# Patient Record
Sex: FEMALE | Race: WHITE | NOT HISPANIC OR LATINO | Employment: UNEMPLOYED | URBAN - METROPOLITAN AREA
[De-identification: names, ages, dates, MRNs, and addresses within clinical notes are randomized per-mention and may not be internally consistent; named-entity substitution may affect disease eponyms.]

---

## 2024-02-15 ENCOUNTER — OFFICE VISIT (OUTPATIENT)
Dept: FAMILY MEDICINE | Facility: CLINIC | Age: 39
End: 2024-02-15
Payer: COMMERCIAL

## 2024-02-15 VITALS
TEMPERATURE: 98 F | HEIGHT: 62 IN | WEIGHT: 194 LBS | HEART RATE: 100 BPM | DIASTOLIC BLOOD PRESSURE: 70 MMHG | SYSTOLIC BLOOD PRESSURE: 114 MMHG | BODY MASS INDEX: 35.7 KG/M2

## 2024-02-15 DIAGNOSIS — Z13.1 SCREENING FOR DIABETES MELLITUS: ICD-10-CM

## 2024-02-15 DIAGNOSIS — F41.1 GAD (GENERALIZED ANXIETY DISORDER): ICD-10-CM

## 2024-02-15 DIAGNOSIS — R07.81 RIB PAIN: ICD-10-CM

## 2024-02-15 DIAGNOSIS — F31.81 BIPOLAR 2 DISORDER: ICD-10-CM

## 2024-02-15 DIAGNOSIS — G43.709 CHRONIC MIGRAINE WITHOUT AURA WITHOUT STATUS MIGRAINOSUS, NOT INTRACTABLE: ICD-10-CM

## 2024-02-15 DIAGNOSIS — J45.40 MODERATE PERSISTENT ASTHMA WITHOUT COMPLICATION: ICD-10-CM

## 2024-02-15 DIAGNOSIS — K21.9 GASTROESOPHAGEAL REFLUX DISEASE, UNSPECIFIED WHETHER ESOPHAGITIS PRESENT: ICD-10-CM

## 2024-02-15 DIAGNOSIS — Z13.220 ENCOUNTER FOR LIPID SCREENING FOR CARDIOVASCULAR DISEASE: ICD-10-CM

## 2024-02-15 DIAGNOSIS — F17.200 TOBACCO DEPENDENCE: ICD-10-CM

## 2024-02-15 DIAGNOSIS — Z00.00 ENCOUNTER FOR ANNUAL HEALTH EXAMINATION: ICD-10-CM

## 2024-02-15 DIAGNOSIS — I10 PRIMARY HYPERTENSION: Primary | ICD-10-CM

## 2024-02-15 DIAGNOSIS — Z11.59 ENCOUNTER FOR HEPATITIS C SCREENING TEST FOR LOW RISK PATIENT: ICD-10-CM

## 2024-02-15 DIAGNOSIS — Z13.6 ENCOUNTER FOR LIPID SCREENING FOR CARDIOVASCULAR DISEASE: ICD-10-CM

## 2024-02-15 PROCEDURE — 1159F MED LIST DOCD IN RCRD: CPT | Mod: CPTII,S$GLB,, | Performed by: INTERNAL MEDICINE

## 2024-02-15 PROCEDURE — 3008F BODY MASS INDEX DOCD: CPT | Mod: CPTII,S$GLB,, | Performed by: INTERNAL MEDICINE

## 2024-02-15 PROCEDURE — 3078F DIAST BP <80 MM HG: CPT | Mod: CPTII,S$GLB,, | Performed by: INTERNAL MEDICINE

## 2024-02-15 PROCEDURE — 99406 BEHAV CHNG SMOKING 3-10 MIN: CPT | Mod: S$GLB,,, | Performed by: INTERNAL MEDICINE

## 2024-02-15 PROCEDURE — 3074F SYST BP LT 130 MM HG: CPT | Mod: CPTII,S$GLB,, | Performed by: INTERNAL MEDICINE

## 2024-02-15 PROCEDURE — 99204 OFFICE O/P NEW MOD 45 MIN: CPT | Mod: 25,S$GLB,, | Performed by: INTERNAL MEDICINE

## 2024-02-15 PROCEDURE — 99999 PR PBB SHADOW E&M-EST. PATIENT-LVL V: CPT | Mod: PBBFAC,,, | Performed by: INTERNAL MEDICINE

## 2024-02-15 RX ORDER — SUMATRIPTAN SUCCINATE 25 MG/1
25 TABLET ORAL
COMMUNITY
Start: 2024-01-30 | End: 2024-02-22 | Stop reason: ALTCHOICE

## 2024-02-15 RX ORDER — BUDESONIDE AND FORMOTEROL FUMARATE DIHYDRATE 160; 4.5 UG/1; UG/1
2 AEROSOL RESPIRATORY (INHALATION) EVERY 12 HOURS
COMMUNITY

## 2024-02-15 RX ORDER — VARENICLINE TARTRATE 0.5 (11)-1
KIT ORAL
Qty: 1 EACH | Refills: 0 | Status: SHIPPED | OUTPATIENT
Start: 2024-02-15 | End: 2024-05-22

## 2024-02-15 RX ORDER — OXCARBAZEPINE 300 MG/1
300 TABLET, FILM COATED ORAL 3 TIMES DAILY
COMMUNITY
Start: 2024-01-29

## 2024-02-15 RX ORDER — TRAZODONE HYDROCHLORIDE 50 MG/1
50 TABLET ORAL NIGHTLY PRN
COMMUNITY
Start: 2024-02-04

## 2024-02-15 RX ORDER — ALBUTEROL SULFATE 90 UG/1
2 AEROSOL, METERED RESPIRATORY (INHALATION) EVERY 4 HOURS PRN
COMMUNITY

## 2024-02-15 RX ORDER — PANTOPRAZOLE SODIUM 40 MG/1
40 TABLET, DELAYED RELEASE ORAL DAILY
COMMUNITY

## 2024-02-15 RX ORDER — AMLODIPINE BESYLATE 10 MG/1
10 TABLET ORAL DAILY
COMMUNITY

## 2024-02-15 RX ORDER — VENLAFAXINE HYDROCHLORIDE 225 MG/1
1 TABLET, EXTENDED RELEASE ORAL DAILY
COMMUNITY
Start: 2024-02-03

## 2024-02-16 ENCOUNTER — HOSPITAL ENCOUNTER (OUTPATIENT)
Dept: RADIOLOGY | Facility: HOSPITAL | Age: 39
Discharge: HOME | End: 2024-02-16
Attending: INTERNAL MEDICINE
Payer: COMMERCIAL

## 2024-02-16 DIAGNOSIS — R07.81 RIB PAIN: ICD-10-CM

## 2024-02-16 PROCEDURE — 71100 X-RAY EXAM RIBS UNI 2 VIEWS: CPT | Mod: TC,PO,RT

## 2024-02-16 PROCEDURE — 71046 X-RAY EXAM CHEST 2 VIEWS: CPT | Mod: TC,PO

## 2024-02-16 PROCEDURE — 71100 X-RAY EXAM RIBS UNI 2 VIEWS: CPT | Mod: 26,RT,, | Performed by: RADIOLOGY

## 2024-02-16 PROCEDURE — 71046 X-RAY EXAM CHEST 2 VIEWS: CPT | Mod: 26,,, | Performed by: RADIOLOGY

## 2024-02-16 NOTE — PROGRESS NOTES
Assessment/Plan:    Problem List Items Addressed This Visit          Neuro    Chronic migraine without aura without status migrainosus, not intractable    Overview     -chronic  -currently using PRN triptan  -continued frequent occurrence  -previously received botox prior to change in insurance  -plan to refer back to neurology to discuss prophylactic treatment         Relevant Orders    Ambulatory referral/consult to Neurology       Psychiatric    Bipolar 2 disorder    Overview     -previously followed by psychiatry   -reports stable symptoms on current medications  -plan to continue         CARLOS (generalized anxiety disorder)       Pulmonary    Moderate persistent asthma without complication    Overview     -chronic  -managed by [facility]  -currently on symbicort BID  -has albuterol PRN            Cardiac/Vascular    Primary hypertension - Primary    Overview     Hypertension Medications               amLODIPine (NORVASC) 10 MG tablet Take 10 mg by mouth once daily.   -at goal today  -continue lifestyle modification with low sodium diet and exercise   -discussed hypertension disease course and importance of treating high blood pressure  -patient understood and advised of risk of untreated blood pressure.  ER precautions were given   for symptoms of hypertensive urgency and emergency.            GI    GERD (gastroesophageal reflux disease)    Overview     -symptoms controlled with daily PPI  -denies alarm symptoms, such as dysphagia, weight loss or N/V  -continue lifestyle modification with avoidance of acidic foods, caffeine, late night eating  -followed by external GI, Dr. Trammell  -has required EGD in past but with no concerning findings, chronic gastritis            Other    Tobacco dependence    Overview     -the patient was counseled on the dangers of tobacco use  -reviewed strategies to maximize success, including counseling, nicotine replacement therapy and pharmacologic option.   -tobacco  cessation class offered  -tried nicotine replacement and wellbutrin without success  -plan to start trial of chantix but patient aware of potential AE given her psychiatric history and will monitor closely for development of these symptoms    Time spent: 3-10 minutes         Relevant Medications    varenicline (CHANTIX STARTING MONTH BOX) 0.5 mg (11)- 1 mg (42) tablet     Other Visit Diagnoses       Rib pain        Relevant Orders    X-Ray Ribs 2 View Right (Completed)    X-Ray Chest PA And Lateral (Completed)    Encounter for annual health examination        Relevant Orders    CBC Auto Differential    Comprehensive Metabolic Panel    Lipid Panel    Hepatitis C Antibody    Hemoglobin A1C    Encounter for hepatitis C screening test for low risk patient        Relevant Orders    Hepatitis C Antibody    Encounter for lipid screening for cardiovascular disease        Relevant Orders    Lipid Panel    Screening for diabetes mellitus        Relevant Orders    Hemoglobin A1C              Jazmin Cee MD  ______________________________________________________________________________________________________________________________    CC: Establish care    HPI:    Patient is a new patient to me here to establish care.     Previous PCP: Dr. Dickey    HTN: The patient is currently being treated for essential hypertension. This condition is chronic and stable. The patient is tolerating their medication well with good compliance.  Denies any adverse effects of medications.  Counseling was offered regarding low sodium diet.  The patient has a reduced salt intake. Routine exercise recommended. The patient denies headache, vision changes, chest pain, palpitations, shortness of breath, or lower extremity edema.    Migraine: chronic history. Previously followed by [facility] and was receiving botox injections which were helping. She is not currently on any preventative treatments. She is using PRN imitrex as needed.  Headaches still occurring several times weekly. Headache characteristics are unchanged.    No other new complaints today.  Remaining chronic conditions have been reviewed and remain stable. Further detail as stated above.      HM reviewed.    Past Medical History:  Past Medical History:   Diagnosis Date    Anxiety     Bipolar disorder     Hypertension     Migraine      Past Surgical History:   Procedure Laterality Date     SECTION      TUBAL LIGATION       Review of patient's allergies indicates:   Allergen Reactions    Montelukast Rash    Sulfa (sulfonamide antibiotics) Rash     Social History     Tobacco Use    Smoking status: Every Day     Current packs/day: 1.00     Types: Cigarettes    Smokeless tobacco: Never   Substance Use Topics    Alcohol use: Not Currently    Drug use: Yes     Types: Marijuana     Family History   Problem Relation Age of Onset    Hypertension Mother     Heart disease Father      Current Outpatient Medications on File Prior to Visit   Medication Sig Dispense Refill    albuterol (PROVENTIL/VENTOLIN HFA) 90 mcg/actuation inhaler Inhale 2 puffs into the lungs every 4 (four) hours as needed.      amLODIPine (NORVASC) 10 MG tablet Take 10 mg by mouth once daily.      budesonide-formoterol 160-4.5 mcg (SYMBICORT) 160-4.5 mcg/actuation HFAA Inhale 2 puffs into the lungs every 12 (twelve) hours.      ibuprofen (ADVIL,MOTRIN) 600 MG tablet Take 1 tablet (600 mg total) by mouth every 6 (six) hours as needed for Pain. 20 tablet 0    OXcarbazepine (TRILEPTAL) 300 MG Tab Take 300 mg by mouth 3 (three) times daily.      pantoprazole (PROTONIX) 40 MG tablet Take 40 mg by mouth once daily.      sumatriptan (IMITREX) 25 MG Tab Take 25 mg by mouth every 2 (two) hours as needed.      traZODone (DESYREL) 50 MG tablet Take 50 mg by mouth nightly as needed.      venlafaxine 225 mg TR24 Take 1 tablet by mouth Daily.       No current facility-administered medications on file prior to visit.       Review  "of Systems   Constitutional:  Negative for chills, diaphoresis, fatigue and fever.   HENT:  Negative for congestion, ear pain, postnasal drip, sinus pain and sore throat.    Eyes:  Negative for pain and redness.   Respiratory:  Negative for cough, chest tightness and shortness of breath.    Cardiovascular:  Negative for chest pain and leg swelling.   Gastrointestinal:  Negative for abdominal pain, constipation, diarrhea, nausea and vomiting.   Genitourinary:  Negative for dysuria and hematuria.   Musculoskeletal:  Negative for arthralgias and joint swelling.   Skin:  Negative for rash.   Neurological:  Positive for headaches. Negative for dizziness and syncope.   Psychiatric/Behavioral:  Negative for dysphoric mood. The patient is not nervous/anxious.      Vitals:    02/15/24 0825   BP: 114/70   Pulse: 100   Temp: 98.2 °F (36.8 °C)   Weight: 88 kg (194 lb)   Height: 5' 2" (1.575 m)       Wt Readings from Last 3 Encounters:   02/15/24 88 kg (194 lb)   02/07/24 87.5 kg (193 lb)       Physical Exam  Constitutional:       General: She is not in acute distress.     Appearance: Normal appearance. She is well-developed.   HENT:      Head: Normocephalic and atraumatic.   Eyes:      Conjunctiva/sclera: Conjunctivae normal.   Cardiovascular:      Rate and Rhythm: Normal rate and regular rhythm.      Pulses: Normal pulses.      Heart sounds: Normal heart sounds. No murmur heard.  Pulmonary:      Effort: Pulmonary effort is normal. No respiratory distress.      Breath sounds: Normal breath sounds.   Abdominal:      General: Bowel sounds are normal. There is no distension.      Palpations: Abdomen is soft.      Tenderness: There is no abdominal tenderness.   Musculoskeletal:         General: Normal range of motion.      Cervical back: Normal range of motion and neck supple.   Skin:     General: Skin is warm and dry.      Findings: No rash.   Neurological:      General: No focal deficit present.      Mental Status: She is alert " and oriented to person, place, and time.      Cranial Nerves: No cranial nerve deficit.      Sensory: No sensory deficit.      Motor: No weakness.      Coordination: Coordination normal.   Psychiatric:         Mood and Affect: Mood normal.         Behavior: Behavior normal.     Health Maintenance   Topic Date Due    Hepatitis C Screening  Never done    Lipid Panel  Never done    Mammogram  Never done    TETANUS VACCINE  04/28/2033

## 2024-02-16 NOTE — PROGRESS NOTES
Results have been released via [facility]. Please verify that these have been viewed by patient. If not, please call patient with results.    I have sent a message to them with the following interpretation (see below).    I have reviewed your recent results.    Xray does not show any new fracture of rib, only the previous fracture. No other abnormal findings on xray. Follow up if pain does not improve over the next couple of weeks.    Please do not hesitate to call or message with any additional questions or concerns.    Jazmin Cee MD

## 2024-02-21 DIAGNOSIS — Z12.31 OTHER SCREENING MAMMOGRAM: ICD-10-CM

## 2024-02-22 ENCOUNTER — OFFICE VISIT (OUTPATIENT)
Dept: NEUROLOGY | Facility: CLINIC | Age: 39
End: 2024-02-22
Payer: COMMERCIAL

## 2024-02-22 VITALS
RESPIRATION RATE: 17 BRPM | WEIGHT: 193.81 LBS | SYSTOLIC BLOOD PRESSURE: 125 MMHG | TEMPERATURE: 98 F | HEIGHT: 62 IN | HEART RATE: 96 BPM | BODY MASS INDEX: 35.66 KG/M2 | DIASTOLIC BLOOD PRESSURE: 84 MMHG

## 2024-02-22 DIAGNOSIS — G44.40 MEDICATION OVERUSE HEADACHE: ICD-10-CM

## 2024-02-22 DIAGNOSIS — R11.0 NAUSEA: ICD-10-CM

## 2024-02-22 DIAGNOSIS — R51.9 WORSENING HEADACHES: ICD-10-CM

## 2024-02-22 DIAGNOSIS — R07.81 RIB PAIN: Primary | ICD-10-CM

## 2024-02-22 DIAGNOSIS — M79.18 CERVICAL MYOFASCIAL PAIN SYNDROME: ICD-10-CM

## 2024-02-22 DIAGNOSIS — G43.719 INTRACTABLE CHRONIC MIGRAINE WITHOUT AURA AND WITHOUT STATUS MIGRAINOSUS: Primary | ICD-10-CM

## 2024-02-22 DIAGNOSIS — I10 PRIMARY HYPERTENSION: ICD-10-CM

## 2024-02-22 DIAGNOSIS — R10.9 RIGHT FLANK PAIN: ICD-10-CM

## 2024-02-22 DIAGNOSIS — G43.709 CHRONIC MIGRAINE WITHOUT AURA WITHOUT STATUS MIGRAINOSUS, NOT INTRACTABLE: ICD-10-CM

## 2024-02-22 DIAGNOSIS — F31.81 BIPOLAR 2 DISORDER: ICD-10-CM

## 2024-02-22 PROCEDURE — 99205 OFFICE O/P NEW HI 60 MIN: CPT | Mod: S$GLB,,, | Performed by: NURSE PRACTITIONER

## 2024-02-22 PROCEDURE — 3044F HG A1C LEVEL LT 7.0%: CPT | Mod: CPTII,S$GLB,, | Performed by: NURSE PRACTITIONER

## 2024-02-22 PROCEDURE — 3079F DIAST BP 80-89 MM HG: CPT | Mod: CPTII,S$GLB,, | Performed by: NURSE PRACTITIONER

## 2024-02-22 PROCEDURE — 1160F RVW MEDS BY RX/DR IN RCRD: CPT | Mod: CPTII,S$GLB,, | Performed by: NURSE PRACTITIONER

## 2024-02-22 PROCEDURE — 99999 PR PBB SHADOW E&M-EST. PATIENT-LVL V: CPT | Mod: PBBFAC,,, | Performed by: NURSE PRACTITIONER

## 2024-02-22 PROCEDURE — 1159F MED LIST DOCD IN RCRD: CPT | Mod: CPTII,S$GLB,, | Performed by: NURSE PRACTITIONER

## 2024-02-22 PROCEDURE — 3074F SYST BP LT 130 MM HG: CPT | Mod: CPTII,S$GLB,, | Performed by: NURSE PRACTITIONER

## 2024-02-22 PROCEDURE — 3008F BODY MASS INDEX DOCD: CPT | Mod: CPTII,S$GLB,, | Performed by: NURSE PRACTITIONER

## 2024-02-22 RX ORDER — GALCANEZUMAB 120 MG/ML
120 INJECTION, SOLUTION SUBCUTANEOUS
Qty: 1 ML | Refills: 11 | Status: SHIPPED | OUTPATIENT
Start: 2024-02-22

## 2024-02-22 RX ORDER — RIMEGEPANT SULFATE 75 MG/75MG
75 TABLET, ORALLY DISINTEGRATING ORAL DAILY PRN
Qty: 16 TABLET | Refills: 11 | Status: SHIPPED | OUTPATIENT
Start: 2024-02-22

## 2024-02-22 RX ORDER — GALCANEZUMAB 120 MG/ML
INJECTION, SOLUTION SUBCUTANEOUS
Qty: 2 ML | Refills: 0 | Status: SHIPPED | OUTPATIENT
Start: 2024-02-22 | End: 2024-04-01

## 2024-02-22 RX ORDER — PREDNISONE 10 MG/1
TABLET ORAL
Qty: 20 TABLET | Refills: 0 | Status: SHIPPED | OUTPATIENT
Start: 2024-02-22 | End: 2024-03-26 | Stop reason: ALTCHOICE

## 2024-02-22 RX ORDER — RIZATRIPTAN BENZOATE 10 MG/1
TABLET ORAL
Qty: 18 TABLET | Refills: 11 | Status: SHIPPED | OUTPATIENT
Start: 2024-02-22

## 2024-02-22 RX ORDER — PROCHLORPERAZINE MALEATE 10 MG
10 TABLET ORAL EVERY 6 HOURS PRN
Qty: 60 TABLET | Refills: 11 | Status: SHIPPED | OUTPATIENT
Start: 2024-02-22

## 2024-02-22 RX ORDER — TIZANIDINE 4 MG/1
TABLET ORAL
Qty: 30 TABLET | Refills: 11 | Status: SHIPPED | OUTPATIENT
Start: 2024-02-22

## 2024-02-22 NOTE — PROGRESS NOTES
Date of service: 2/22/2024  Referring provider: Dr. Jazmin Cee    Subjective:      Chief complaint: Headache       Patient ID: Monica Durant is a 38 y.o. female with bipolar, chronic migraine, anxiety, GERD, asthma, HTN who presents for new patient evaluation of headache     History of Present Illness    ORIGINAL HEADACHE HISTORY - 2/22/24  Age at onset and course over time: mid 20's began with infrequent migraines. She had severe attacks once every 4-5 months. Severity and frequency has increased as she has gotten older.   Family history of migraines - mom  Last eye exam - a little over a year ago  Location: generally right temple  Quality:  [x] pressure [x] tight [x] throbbing [x] sharp [x] stabbing   Severity: current 3 with range 2-10  Duration: days  Frequency: daily  Headaches awaken at night?:  no  Worst time of day: all day  Associated with: [x] photophobia [x]  phonophobia [] osmophobia [] blurred vision  [] double vision [x] loss of appetite [x] nausea [x] vomiting [] dizziness [] vertigo  [] tinnitus [] irritability [] sinus pressure [x] problems with concentration   [x] neck tightness   Alleviated by:  [x] sleep [x] darkness [x] massage [] heat [x] ice [x] medication  Exacerbated by:  [x] fatigue [x] light [x] noise [] smells [x] coughing [] sneezing  [] bending over [] ovulation [] menses [] alcohol [x] change in weather [x]  stress  Ipsilateral autonomic: [] nasal congestion [] lacrimation [] ptosis [] injection [] edema [] foreign body sensation [] ear fullness   ICP:  [] transient visual obscurations  [] tinnitus   [] positional headache  [x] non-positional   Sleep habits: trouble staying asleep, unrefreshing sleep, has had sleep study which showed no sleep apnea  Caffeine intake: 10 (combination of espresso and coke zero)  Gyn status (if female): tubal ligation  HIT 6: 68    Current acute treatment:  Sumatriptan - 3 days per week  Ibuprofen - daily  Tylenol - daily     Current  prevention:  Venlafaxine  Trazodone  Trileptal  Amlodipine     Previously tried/failed acute treatment:  Excedrin  Tylenol  Mobic  Nurtec  Ubrelvy    Previously tried/failed preventative treatment:  Tegretol - not effective  Topamax - not effective   Atenolol  Nebivolol  Fluoxetine  Wellbutrin  Gabapentin   Propranolol - for headaches, not effective   Botox - one session by Dr. Gordon over one year ago  Aimovig   Emgality    Review of patient's allergies indicates:   Allergen Reactions    Montelukast Rash    Sulfa (sulfonamide antibiotics) Rash     Current Outpatient Medications   Medication Sig Dispense Refill    albuterol (PROVENTIL/VENTOLIN HFA) 90 mcg/actuation inhaler Inhale 2 puffs into the lungs every 4 (four) hours as needed.      amLODIPine (NORVASC) 10 MG tablet Take 10 mg by mouth once daily.      budesonide-formoterol 160-4.5 mcg (SYMBICORT) 160-4.5 mcg/actuation HFAA Inhale 2 puffs into the lungs every 12 (twelve) hours.      ibuprofen (ADVIL,MOTRIN) 600 MG tablet Take 1 tablet (600 mg total) by mouth every 6 (six) hours as needed for Pain. 20 tablet 0    OXcarbazepine (TRILEPTAL) 300 MG Tab Take 300 mg by mouth 3 (three) times daily.      pantoprazole (PROTONIX) 40 MG tablet Take 40 mg by mouth once daily.      traZODone (DESYREL) 50 MG tablet Take 50 mg by mouth nightly as needed.      varenicline (CHANTIX STARTING MONTH BOX) 0.5 mg (11)- 1 mg (42) tablet Take one 0.5mg tab by mouth once daily X3 days,then increase to one 0.5mg tab twice daily X4 days,then increase to one 1mg tab twice daily 1 each 0    venlafaxine 225 mg TR24 Take 1 tablet by mouth Daily.      EMGALITY  mg/mL PnIj Inject 240mg (2 injections) subcutaneously at separate sites, once (loading dose). Start maintenance dose 28 days later. 2 each 0    galcanezumab-gnlm (EMGALITY PEN) 120 mg/mL PnIj Inject 1 mL (120 mg total) into the skin every 28 days. 1 each 11    predniSONE (DELTASONE) 10 MG tablet 4 tab PO in AM x 2 days, 3 tab  in AM x2 days, 2 tab in AM x 2 days, 1 tab in AM x 2 days then stop 20 tablet 0    prochlorperazine (COMPAZINE) 10 MG tablet Take 1 tablet (10 mg total) by mouth every 6 (six) hours as needed (migraine or nausea). 60 tablet 11    rimegepant (NURTEC) 75 mg odt Take 1 tablet (75 mg total) by mouth daily as needed for Migraine. Place ODT tablet on the tongue; alternatively the ODT tablet may be placed under the tongue 16 tablet 11    rizatriptan (MAXALT) 10 MG tablet 1 tab PO PRN migraine. May repeat every 2 hours for max 3 tabs in 24 hours. Use no more than 10 days per month. 18 tablet 11    tiZANidine (ZANAFLEX) 4 MG tablet Half or full tablet by mouth at night as needed for muscle spasm 30 tablet 11     No current facility-administered medications for this visit.       Past Medical History  Past Medical History:   Diagnosis Date    Anxiety     Bipolar disorder     Hypertension     Migraine        Past Surgical History  Past Surgical History:   Procedure Laterality Date     SECTION      ESOPHAGOGASTRODUODENOSCOPY  2024    [facility]    TUBAL LIGATION         Family History  Family History   Problem Relation Age of Onset    Hypertension Mother     Heart disease Father        Social History  Social History     Socioeconomic History    Marital status:    Tobacco Use    Smoking status: Every Day     Current packs/day: 1.00     Types: Cigarettes    Smokeless tobacco: Never   Substance and Sexual Activity    Alcohol use: Not Currently    Drug use: Yes     Types: Marijuana     Social Determinants of Health     Financial Resource Strain: Medium Risk (2024)    Overall Financial Resource Strain (CARDIA)     Difficulty of Paying Living Expenses: Somewhat hard   Food Insecurity: No Food Insecurity (2024)    Hunger Vital Sign     Worried About Running Out of Food in the Last Year: Never true     Ran Out of Food in the Last Year: Never true   Transportation Needs: No Transportation Needs  (2/22/2024)    PRAPARE - Transportation     Lack of Transportation (Medical): No     Lack of Transportation (Non-Medical): No   Physical Activity: Sufficiently Active (2/22/2024)    Exercise Vital Sign     Days of Exercise per Week: 4 days     Minutes of Exercise per Session: 60 min   Stress: Stress Concern Present (2/22/2024)    [employer] - Occupational Stress Questionnaire     Feeling of Stress : Very much   Social Connections: Unknown (2/22/2024)    Social Connection and Isolation Panel [NHANES]     Frequency of Communication with Friends and Family: More than three times a week     Frequency of Social Gatherings with Friends and Family: Once a week     Active Member of Clubs or Organizations: No     Attends Club or Organization Meetings: Patient declined     Marital Status:    Housing Stability: High Risk (2/22/2024)    Housing Stability Vital Sign     Unable to Pay for Housing in the Last Year: Yes     Number of Places Lived in the Last Year: 1     Unstable Housing in the Last Year: No        Review of Systems  14-point review of systems as follows:   No check zoya indicates NEGATIVE response   Constitutional: [x] weight loss, [x] change to appetite   Eyes: [x] change in vision, [] double vision   Ears, nose, mouth, throat: [] frequent nose bleeds, [] ringing in the ears   Respiratory: [x] cough, [x] wheezing   Cardiovascular: [x] chest pain, [x] palpitations   Gastrointestinal: [] jaundice, [] nausea/vomiting   Genitourinary: [] incontinence, [] burning with urination   Hematologic/lymphatic: [] easy bruising/bleeding, [x] night sweats   Neurological: [] numbness, [] weakness   Endocrine: [] fatigue, [x] heat/cold intolerance   Allergy/Immunologic: [] fevers, [] chills   Musculoskeletal: [x] muscle pain, [x] joint pain   Psychiatric: [] thoughts of harming self/others, [] depression   Integumentary: [] rashes, [] sores that do not heal     Objective:        Vitals:     02/22/24 1006   BP: 125/84   Pulse: 96   Resp: 17   Temp: 97.5 °F (36.4 °C)     Body mass index is 35.44 kg/m².    2/22/24  Constitutional: appears in no acute distress, well-developed, well-nourished     Eyes: normal conjunctiva, PERRLA    Ears, nose, mouth, throat: external appearance of ears and nose normal, hearing intact     Cardiovascular: regular rate and rhythm, no murmurs appreciated    Respiratory: unlabored respirations, breath sounds normal bilaterally    Gastrointestinal: no visible abdominal masses, no guarding, no visible hernia    Musculoskeletal: normal tone in all four extremities. No abnormal movements. No pronator drift. No orbit. Symmetric finger tapping. Normal station. Normal regular gait. Normal tandem gait.      Spine:   CERVICAL SPINE:  ROM: normal   MUSCLE SPASM: in all planes    FACET LOADING: bilateral    SPURLING: no  FREDI / RICHMOND tender: no     Psychiatric: normal judgment and insight. Oriented to person, place, and time.     Neurologic:   Cortical functions: recent and remote memory intact, normal attention span and concentration, speech fluent, adequate fund of knowledge   Cranial nerves: visual fields full, PERRLA, EOMI, symmetric facial strength, hearing intact, palate elevates symmetrically, shoulder shrug 5/5, tongue protrudes midline   Reflexes: 2+ in the upper and lower extremities, no Nieto  Sensation: intact to temperature throughout   Coordination: normal finger to nose, heel to shin    Data Review:     I have personally reviewed the referring provider's notes, labs, & imaging made available to me today.      RADIOLOGY STUDIES:  I have personally reviewed the pertinent images performed.       No results found for this or any previous visit.    Lab Results   Component Value Date     02/16/2024    K 4.1 02/16/2024     02/16/2024    CO2 23 02/16/2024    BUN 7 02/16/2024    CREATININE 0.7 02/16/2024    GLU 93 02/16/2024    HGBA1C 5.2 02/16/2024    AST 15 02/16/2024     "ALT 16 02/16/2024    ALBUMIN 3.9 02/16/2024    PROT 6.4 02/16/2024    BILITOT 0.3 02/16/2024    CHOL 232 (H) 02/16/2024    HDL 45 02/16/2024    LDLCALC 150.4 02/16/2024    TRIG 183 (H) 02/16/2024       Lab Results   Component Value Date    WBC 9.70 02/16/2024    HGB 14.9 02/16/2024    HCT 45.7 02/16/2024    MCV 94 02/16/2024     02/16/2024       No results found for: "TSH"        Assessment & Plan:       Problem List Items Addressed This Visit          Neuro    Intractable chronic migraine without aura and without status migrainosus - Primary    Overview     Migraine headaches since her 20's. Headaches are typically unilateral, moderate to severe in intensity, worsen with activity, pounding in quality and associated with sensitivity to light and sound.   We discussed options for prevention to include CGRP vs Botox. Will start with CGRP. Galcanezumab (Emgality) treatment was approved for the prevention of acute and chronic migraine on 9/27/2018. The patient will be an ideal candidate for Emgality. We are planning for 3 treatments 28 days apart. If we see no improvement after 3 treatments, we will discontinue the injections.   For acute attacks, would ideally start Nurtec.          Relevant Medications    galcanezumab-gnlm (EMGALITY PEN) 120 mg/mL PnIj    EMGALITY  mg/mL PnIj    predniSONE (DELTASONE) 10 MG tablet    rizatriptan (MAXALT) 10 MG tablet    rimegepant (NURTEC) 75 mg odt    Other Relevant Orders    Ambulatory referral/consult to Physical/Occupational Therapy    Medication overuse headache    Overview     Daily ibuprofen for at least past year. Discussed nature of diagnosis and need to stop all OTC to see any improvement in migraine frequency. Add prednisone course and compazine. Lifestyle habits discussed as she does drink high amounts of caffeine daily          Relevant Medications    predniSONE (DELTASONE) 10 MG tablet       Psychiatric    Bipolar 2 disorder    Overview     -previously " followed by psychiatry   -reports stable symptoms on current medications  -plan to continue         Current Assessment & Plan     Reports stable. Discussed use of prednisone can worsen bipolar symptoms. Patient verbalized understanding and reports she has tolerated steroids in the past without concern.             Cardiac/Vascular    Primary hypertension    Overview     Hypertension Medications               amLODIPine (NORVASC) 10 MG tablet Take 10 mg by mouth once daily.   -at goal today  -continue lifestyle modification with low sodium diet and exercise   -discussed hypertension disease course and importance of treating high blood pressure  -patient understood and advised of risk of untreated blood pressure.  ER precautions were given   for symptoms of hypertensive urgency and emergency.         Current Assessment & Plan     Controlled today. Will avoid Aimovig due to side effect of elevated blood pressure          Other Visit Diagnoses       Worsening headaches        Relevant Orders    MRI Brain W WO Contrast    Cervical myofascial pain syndrome        Relevant Medications    tiZANidine (ZANAFLEX) 4 MG tablet    Other Relevant Orders    Ambulatory referral/consult to Physical/Occupational Therapy    Nausea        Relevant Medications    prochlorperazine (COMPAZINE) 10 MG tablet                Please call our clinic at [phone] or send a message on the [employer] portal if there are any changes to the plan described below, for example,if you are not contacted for the requested tests, referral(s) within one week, if you are unable to receive the medications prescribed, or if you feel you need to change the treatment course for any reason.     TESTING:  -- brain MRI    REFERRALS:  -- physical therapy    PREVENTION (use daily regardless of headache):  -- Start Emgality injection once every 28 days (2 prescriptions, first month give yourself TWO shots, all other months give yourself ONE shot) (will come from  [facility], they will call you)  -- start magnesium in ONE of the following preparations -               1. Magnesium oxide 800mg daily (the most common over the counter kind, may causes loose stools)              2. Magnesium citrate 400-500mg daily (harder to find, but more neutral on the bowels)              3. Magnesium glycinate 400mg daily (hardest to find, look online, but most bowel-neutral, best absorbed)     AS-NEEDED TREATMENT (use total no more than 10 days per month unless otherwise stated):  -- STOP ibuprofen and tylenol  -- START 8-day prednisone course tomorrow morning with food  -- START rizatriptan with next migraine. You can repeat two hours later if needed  -- START Nurtec with next migraine. This dissolves under the tongue and is only taken once in 24 hour time frame.  -- START tizanidine at night. This is a muscle relaxer and it will also make you potentially sleepy. Start with half a tablet to see how you respond but can take up to a whole tablet if needed  -- start compazine. This is a nausea medication that also has anti-migraine properties. Can take daily and will not contribute to rebound headaches    Follow up in about 3 months (around 5/22/2024).    Alma Rosa Contreras NP

## 2024-02-22 NOTE — ASSESSMENT & PLAN NOTE
Reports stable. Discussed use of prednisone can worsen bipolar symptoms. Patient verbalized understanding and reports she has tolerated steroids in the past without concern.

## 2024-02-23 ENCOUNTER — TELEPHONE (OUTPATIENT)
Dept: FAMILY MEDICINE | Facility: CLINIC | Age: 39
End: 2024-02-23
Payer: COMMERCIAL

## 2024-02-23 NOTE — TELEPHONE ENCOUNTER
----- Message from Karli Galindo sent at 2/22/2024  3:22 PM CST -----  Type:  Needs Medical Advice    Who Called:  Steff from [facility]    Would the patient rather a call back or a response via [facility]?  Call back    Best Call Back Number:  [phone]    [facility] Fax- [phone]    Additional Information:  Pt is wanting MRI orders sent over to [facility] instead of having it done at [facility].   Please call back to advise. Thanks!

## 2024-02-26 ENCOUNTER — HOSPITAL ENCOUNTER (OUTPATIENT)
Dept: RADIOLOGY | Facility: HOSPITAL | Age: 39
Discharge: HOME | End: 2024-02-26
Attending: PHYSICIAN ASSISTANT
Payer: COMMERCIAL

## 2024-02-26 ENCOUNTER — OFFICE VISIT (OUTPATIENT)
Dept: FAMILY MEDICINE | Facility: CLINIC | Age: 39
End: 2024-02-26
Payer: COMMERCIAL

## 2024-02-26 VITALS
BODY MASS INDEX: 34.6 KG/M2 | WEIGHT: 188 LBS | TEMPERATURE: 98 F | HEART RATE: 100 BPM | DIASTOLIC BLOOD PRESSURE: 81 MMHG | HEIGHT: 62 IN | SYSTOLIC BLOOD PRESSURE: 130 MMHG

## 2024-02-26 DIAGNOSIS — M54.41 ACUTE MIDLINE LOW BACK PAIN WITH BILATERAL SCIATICA: Primary | ICD-10-CM

## 2024-02-26 DIAGNOSIS — M54.41 ACUTE MIDLINE LOW BACK PAIN WITH BILATERAL SCIATICA: ICD-10-CM

## 2024-02-26 DIAGNOSIS — M54.42 ACUTE MIDLINE LOW BACK PAIN WITH BILATERAL SCIATICA: ICD-10-CM

## 2024-02-26 DIAGNOSIS — M54.42 ACUTE MIDLINE LOW BACK PAIN WITH BILATERAL SCIATICA: Primary | ICD-10-CM

## 2024-02-26 PROCEDURE — 99213 OFFICE O/P EST LOW 20 MIN: CPT | Mod: S$GLB,,, | Performed by: PHYSICIAN ASSISTANT

## 2024-02-26 PROCEDURE — 72100 X-RAY EXAM L-S SPINE 2/3 VWS: CPT | Mod: 26,,, | Performed by: RADIOLOGY

## 2024-02-26 PROCEDURE — 99999 PR PBB SHADOW E&M-EST. PATIENT-LVL V: CPT | Mod: PBBFAC,,, | Performed by: PHYSICIAN ASSISTANT

## 2024-02-26 PROCEDURE — 72100 X-RAY EXAM L-S SPINE 2/3 VWS: CPT | Mod: TC,PO

## 2024-02-26 PROCEDURE — 1159F MED LIST DOCD IN RCRD: CPT | Mod: CPTII,S$GLB,, | Performed by: PHYSICIAN ASSISTANT

## 2024-02-26 PROCEDURE — 1160F RVW MEDS BY RX/DR IN RCRD: CPT | Mod: CPTII,S$GLB,, | Performed by: PHYSICIAN ASSISTANT

## 2024-02-26 PROCEDURE — 3008F BODY MASS INDEX DOCD: CPT | Mod: CPTII,S$GLB,, | Performed by: PHYSICIAN ASSISTANT

## 2024-02-26 PROCEDURE — 3075F SYST BP GE 130 - 139MM HG: CPT | Mod: CPTII,S$GLB,, | Performed by: PHYSICIAN ASSISTANT

## 2024-02-26 PROCEDURE — 3044F HG A1C LEVEL LT 7.0%: CPT | Mod: CPTII,S$GLB,, | Performed by: PHYSICIAN ASSISTANT

## 2024-02-26 PROCEDURE — 3079F DIAST BP 80-89 MM HG: CPT | Mod: CPTII,S$GLB,, | Performed by: PHYSICIAN ASSISTANT

## 2024-02-26 RX ORDER — HYDROCODONE BITARTRATE AND ACETAMINOPHEN 5; 325 MG/1; MG/1
1 TABLET ORAL EVERY 6 HOURS PRN
Qty: 20 TABLET | Refills: 0 | Status: SHIPPED | OUTPATIENT
Start: 2024-02-26 | End: 2024-05-22

## 2024-02-26 RX ORDER — HYDROCODONE BITARTRATE AND ACETAMINOPHEN 5; 325 MG/1; MG/1
1 TABLET ORAL EVERY 6 HOURS PRN
Qty: 20 TABLET | Refills: 0 | Status: CANCELLED | OUTPATIENT
Start: 2024-02-26
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
## 2024-02-27 ENCOUNTER — PATIENT MESSAGE (OUTPATIENT)
Dept: FAMILY MEDICINE | Facility: CLINIC | Age: 39
End: 2024-02-27
Payer: COMMERCIAL

## 2024-02-27 NOTE — PROGRESS NOTES
Results have been released via [facility]. Please verify that these have been viewed by patient. If not, please call patient with results.     I have sent a message to them with the following interpretation (see below).    I have reviewed your recent lumbar XR which showed mild degenerative changes mainly seen at L4-L5. Please follow up with pain management as discussed at your visit.     Please do not hesitate to call or message with any additional questions or concerns.    Divine Saleh PA-C

## 2024-02-28 ENCOUNTER — TELEPHONE (OUTPATIENT)
Dept: FAMILY MEDICINE | Facility: CLINIC | Age: 39
End: 2024-02-28
Payer: COMMERCIAL

## 2024-02-28 ENCOUNTER — TELEPHONE (OUTPATIENT)
Dept: NEUROLOGY | Facility: CLINIC | Age: 39
End: 2024-02-28
Payer: COMMERCIAL

## 2024-02-28 DIAGNOSIS — R10.9 ABDOMINAL PAIN, UNSPECIFIED ABDOMINAL LOCATION: Primary | ICD-10-CM

## 2024-02-28 NOTE — TELEPHONE ENCOUNTER
----- Message from Cristiane Contreras sent at 2/28/2024  8:13 AM CST -----  Contact: dipak Anderson is calling to see if her Ct scan orders be sent to ([facility] in [place] due to the out of pocket cost, please call her at  [phone].    Thanks  Td

## 2024-02-28 NOTE — TELEPHONE ENCOUNTER
Spoke to Ciarra PANDYA With [employer]--she states that the pt is wanting to have the MRI done at [facility] d/t cost. Authorizations number: [id number] valid 2/22/24-5/21/24. Order changed to external and faxed to [facility] @ [phone].

## 2024-02-28 NOTE — TELEPHONE ENCOUNTER
I have signed for the following orders AND/OR meds.  Please call the patient and ask the patient to schedule the testing AND/OR inform about any medications that were sent. Medications have been sent to pharmacy listed below      Orders Placed This Encounter   Procedures    CT Abdomen Pelvis W Wo Contrast     Standing Status:   Future     Standing Expiration Date:   2/28/2025     Order Specific Question:   Is the patient pregnant?     Answer:   No     Order Specific Question:   Is the patient taking metformin or a metformin combination medication?  If so, the patient should hold the medication for 2 days following contrast.     Answer:   No     Order Specific Question:   Does this patient have impaired renal function?     Answer:   No     Order Specific Question:   Diabetes?     Answer:   No     Order Specific Question:   May the Radiologist modify the order per protocol to meet the clinical needs of the patient?     Answer:   Yes     Order Specific Question:   Will this service be billed to a Worker's Comp policy?     Answer:   No     Order Specific Question:   Oral/Rectal Contrast instructions:     Answer:   NO Oral Contrast     Order Specific Question:   Special CT ABD Protocol Request?     Answer:   Routine              [id number] - [place] - [address] AT [facility] OF [address]  [address] [facility] [address]  Phone: [phone] Fax: [phone]

## 2024-02-28 NOTE — TELEPHONE ENCOUNTER
----- Message from Melody Cortez sent at 2/28/2024 10:02 AM CST -----  Type: Needs Medical Advice  Who Called:  Chichi Gonzalez  Best Call Back Number: [phone] opt 2 then 3  Additional Information: requesting the pts orders to be [facility] to [facility], the fax number is [phone], pl call bk to advise thanks

## 2024-03-01 ENCOUNTER — PATIENT MESSAGE (OUTPATIENT)
Dept: FAMILY MEDICINE | Facility: CLINIC | Age: 39
End: 2024-03-01
Payer: COMMERCIAL

## 2024-03-01 ENCOUNTER — PATIENT MESSAGE (OUTPATIENT)
Dept: NEUROLOGY | Facility: CLINIC | Age: 39
End: 2024-03-01
Payer: COMMERCIAL

## 2024-03-01 DIAGNOSIS — J18.9 COMMUNITY ACQUIRED PNEUMONIA OF RIGHT LOWER LOBE OF LUNG: Primary | ICD-10-CM

## 2024-03-08 RX ORDER — PROMETHAZINE HYDROCHLORIDE AND DEXTROMETHORPHAN HYDROBROMIDE 6.25; 15 MG/5ML; MG/5ML
5 SYRUP ORAL EVERY 6 HOURS PRN
Qty: 118 ML | Refills: 0 | Status: SHIPPED | OUTPATIENT
Start: 2024-03-08 | End: 2024-03-18

## 2024-03-08 RX ORDER — AMOXICILLIN AND CLAVULANATE POTASSIUM 875; 125 MG/1; MG/1
1 TABLET, FILM COATED ORAL 2 TIMES DAILY
Qty: 10 TABLET | Refills: 0 | Status: SHIPPED | OUTPATIENT
Start: 2024-03-08 | End: 2024-03-13

## 2024-03-08 RX ORDER — AZITHROMYCIN 500 MG/1
500 TABLET, FILM COATED ORAL DAILY
Qty: 3 TABLET | Refills: 0 | Status: SHIPPED | OUTPATIENT
Start: 2024-03-08 | End: 2024-03-26 | Stop reason: ALTCHOICE

## 2024-03-08 NOTE — TELEPHONE ENCOUNTER
Cough medication sent in. The rib pain could be related to possible pneumonia. Otherwise, I would defer to pain management for further work up and treatment of that pain.    I have signed for the following orders AND/OR meds.  Please call the patient and ask the patient to schedule the testing AND/OR inform about any medications that were sent. Medications have been sent to pharmacy listed below      Orders Placed This Encounter   Procedures    CT Chest Without Contrast     Standing Status:   Future     Standing Expiration Date:   3/8/2025     Order Specific Question:   May the Radiologist modify the order per protocol to meet the clinical needs of the patient?     Answer:   Yes       Medications Ordered This Encounter   Medications    amoxicillin-clavulanate 875-125mg (AUGMENTIN) 875-125 mg per tablet     Sig: Take 1 tablet by mouth 2 (two) times daily. for 5 days     Dispense:  10 tablet     Refill:  0    azithromycin (ZITHROMAX) 500 MG tablet     Sig: Take 1 tablet (500 mg total) by mouth once daily.     Dispense:  3 tablet     Refill:  0    promethazine-dextromethorphan (PROMETHAZINE-DM) 6.25-15 mg/5 mL Syrp     Sig: Take 5 mLs by mouth every 6 (six) hours as needed (cough).     Dispense:  118 mL     Refill:  0         [id number] - [facility] [address] AT [facility] [address] [facility] [address] [address]  Phone: [phone] Fax: [phone]

## 2024-03-08 NOTE — TELEPHONE ENCOUNTER
Since she is having respiratory symptoms, I would like for her to start antibiotics for possible pneumonia and repeat the CT in 3-4 weeks. I ordered the CT external so she can return [facility] for repeat CT.    I have signed for the following orders AND/OR meds.  Please call the patient and ask the patient to schedule the testing AND/OR inform about any medications that were sent. Medications have been sent to pharmacy listed below      Orders Placed This Encounter   Procedures    CT Chest Without Contrast     Standing Status:   Future     Standing Expiration Date:   3/8/2025     Order Specific Question:   May the Radiologist modify the order per protocol to meet the clinical needs of the patient?     Answer:   Yes       Medications Ordered This Encounter   Medications    amoxicillin-clavulanate 875-125mg (AUGMENTIN) 875-125 mg per tablet     Sig: Take 1 tablet by mouth 2 (two) times daily. for 5 days     Dispense:  10 tablet     Refill:  0    azithromycin (ZITHROMAX) 500 MG tablet     Sig: Take 1 tablet (500 mg total) by mouth once daily.     Dispense:  3 tablet     Refill:  0         [facility][id number] - [place] [address] AT [facility] OF [facility] [address]  [address] [facility] [address]  Phone: [phone] Fax: [phone]

## 2024-03-19 ENCOUNTER — PATIENT MESSAGE (OUTPATIENT)
Dept: NEUROLOGY | Facility: CLINIC | Age: 39
End: 2024-03-19
Payer: COMMERCIAL

## 2024-03-20 ENCOUNTER — TELEPHONE (OUTPATIENT)
Dept: NEUROLOGY | Facility: CLINIC | Age: 39
End: 2024-03-20
Payer: COMMERCIAL

## 2024-03-20 DIAGNOSIS — R09.89 CHRONIC SINUS COMPLAINTS: Primary | ICD-10-CM

## 2024-03-20 NOTE — TELEPHONE ENCOUNTER
Spoke to pt-advised of MRI results per [facility]. Pt is wondering if she needs to see ENT? If so, can a referral be placed. Please advise.

## 2024-03-20 NOTE — TELEPHONE ENCOUNTER
"Received MRI reports from [facility]  Brain impression "no evidence of acute infarction, acute hydrocephalus, or mass effect. There is no abnormal enhancement. Mild mucosal disease of the sphenoid sinuses and ethmoid air cells."    Lumbar spine impression "diffuse disc bulge with right subarticular disc protrusion which extends 3 mm beyond the dorsal endplate mildly effacing the right lateral recess. Tiny dorsal annular fissure"  "

## 2024-03-20 NOTE — TELEPHONE ENCOUNTER
Spoke to pt--advised that ENT referral has been placed and she can either schedule via the portal or by calling [phone] to assist with scheduling. Pt v/u.

## 2024-03-26 ENCOUNTER — OFFICE VISIT (OUTPATIENT)
Dept: OTOLARYNGOLOGY | Facility: CLINIC | Age: 39
End: 2024-03-26
Payer: COMMERCIAL

## 2024-03-26 VITALS — WEIGHT: 189.81 LBS | BODY MASS INDEX: 34.93 KG/M2 | HEIGHT: 62 IN

## 2024-03-26 DIAGNOSIS — K21.9 GASTROESOPHAGEAL REFLUX DISEASE, UNSPECIFIED WHETHER ESOPHAGITIS PRESENT: ICD-10-CM

## 2024-03-26 DIAGNOSIS — J45.40 MODERATE PERSISTENT ASTHMA WITHOUT COMPLICATION: ICD-10-CM

## 2024-03-26 DIAGNOSIS — R09.89 CHRONIC SINUS COMPLAINTS: ICD-10-CM

## 2024-03-26 DIAGNOSIS — F17.200 TOBACCO DEPENDENCE: ICD-10-CM

## 2024-03-26 DIAGNOSIS — R09.82 PND (POST-NASAL DRIP): ICD-10-CM

## 2024-03-26 DIAGNOSIS — J30.9 ALLERGIC RHINITIS, UNSPECIFIED SEASONALITY, UNSPECIFIED TRIGGER: Primary | ICD-10-CM

## 2024-03-26 PROCEDURE — 99214 OFFICE O/P EST MOD 30 MIN: CPT | Mod: 25,S$GLB,, | Performed by: NURSE PRACTITIONER

## 2024-03-26 PROCEDURE — 1159F MED LIST DOCD IN RCRD: CPT | Mod: CPTII,S$GLB,, | Performed by: NURSE PRACTITIONER

## 2024-03-26 PROCEDURE — 99999 PR PBB SHADOW E&M-EST. PATIENT-LVL IV: CPT | Mod: PBBFAC,,, | Performed by: NURSE PRACTITIONER

## 2024-03-26 PROCEDURE — 31231 NASAL ENDOSCOPY DX: CPT | Mod: S$GLB,,, | Performed by: NURSE PRACTITIONER

## 2024-03-26 PROCEDURE — 3044F HG A1C LEVEL LT 7.0%: CPT | Mod: CPTII,S$GLB,, | Performed by: NURSE PRACTITIONER

## 2024-03-26 PROCEDURE — 3008F BODY MASS INDEX DOCD: CPT | Mod: CPTII,S$GLB,, | Performed by: NURSE PRACTITIONER

## 2024-03-26 NOTE — PATIENT INSTRUCTIONS
"Continue oral antihistamine  Follow-up with [facility]    ENT SINUS REGIMEN:    "ENT-APPROVED" NASAL SPRAYS:  Flonase / fluticasone / Nasacort / Rhinocort (steroid spray) is best for stuffy, pressure, fullness. Available over-the-counter without a prescription.   Astepro / azelastine (antihistamine spray) is best for itchy, drippy, sneezy. Available over-the-counter without a prescription.       Use as directed, spraying 1-2 times in each nostril each day. It may take 2-3 days to 2-3 weeks to begin seeing improvement. This medication needs to be taken consistently to see results. Overall, this is a well-tolerated medication with low side effects. The benefit of nasal steroids as opposed to oral steroids is that the nasal steroid spray works primarily in the nose. Common side effects can include: headache, nasal dryness, minor nose bleed.  Rare side effects may include:  septal perforation, elevation in eye pressure, dry eyes, change in smell, allergic reaction.  Notify your provider if you have any concerns or experience these symptoms.     Nasal spray instructions:  Blow nose first gently to clean. Keep chin level with the floor (do not tilt head forward or back). Using the opposite hand (example: right hand for left nostril, left hand for right nostril) insert nasal spray taking caution to direct it AWAY from the middle wall inside the nose (septum) to avoid irritating nasal septum which could cause nosebleed.  Do not tilt spray up but rather flat and out along the roof of your mouth to spray. Angle the tip of the spray out slightly toward the direction of the ears; then spray. Do not take quick vigorous sniff but rather slow gentle inhalation while waiting for medication to absorb into nasal passages. Then administer second spray in same way.     Nasal saline rinse kit (use Neti pot or [employer] sinus rinse kit) -- Rinse your sinuses once to twice daily to reduce the allergen burden in your nose. Use sterile water " (boiled tap water which has cooled) or distilled bottled water. Add 1/4 teaspoon sea salt and a pinch of baking soda or a mixture packet from the maker of your sinus rinse kit.  Rinse through both sides of nose to cleanse sinus and nasal passages, bending forward with head tilted down. Keep your mouth open, without holding your breath. Squeeze bottle gently until solution starts draining from the opposite nasal passage. After bottle is empty, blow nose very gently, without pinching nose completely, to avoid pressure on eardrums.  There are useful YouTube videos that show demonstration of how to do these properly.

## 2024-03-26 NOTE — PROGRESS NOTES
"Otolaryngology Clinic Note    Subjective:       Patient ID: Monica Durant is a 38 y.o. female.    Chief Complaint: Sinusitis (chronic)      History of Present Illness: Monica Durant is a 38 y.o. female presenting with pnd.     Patient has known migraines and is being followed by neurology. At her last appointment  Kj Contreras NP ordered an MRI. Per neurology's note: Received MRI reports from [facility]. Brain impression "no evidence of acute infarction, acute hydrocephalus, or mass effect. There is no abnormal enhancement. Mild mucosal disease of the sphenoid sinuses and ethmoid air cells." The NP belives some of her headache could be caused by her sinus issues and referred her here.     She states she has always had sinus issues. She states she gets sick every month or at least a couple she is diagnosed with a sinus infection. She normally goes to walk in clinics in the last. She was treated  t for pneumonia 3/8/24 (She was given azithromycin and amoxicillin). She is on symbicort. He is using her albuterol 1-2x a day for the last 2 months. She is also doing breathing treatments 2x a day for the last 3 weeks. She states she had a CT of her chest at the end of February that showed pneumonia. She has asthma and sees Dr. Draper, she sees her every 6 months. She has not contacted their office since this illness. She states after completion of her 2 antibiotics she did not see any improvement in her symptoms. She has not called to notify PCP or pulmonary about this.     She gets coughing with mucous production, blood streaks in her nasal mucous, pnd, headache, congestion, occ fever. She denies any sinus pain or pressure, tooth pain, itchy eyes, watery eyes.   She has been allergy tested around a year ago by her pulm: pollen. She did not require any allergy shots. She takes it daily, xyzal. She is allergic to singulair. She has tried flonase in the past and uses as needed. She uses it a few a week. She does use a saline " spray a few times a week as well.     She had a PND, cough (productive now, clear today), she states she vomits sometimes due to the mucous. She feels like cough is more from the PND. She is having wheezing as well. She denies any fever. She denies any current headache, sinus pain or pressure. She denies any congestion but does have a runny nose. She has reflux and takes protonix.     Past Surgical History:   Procedure Laterality Date     SECTION      ESOPHAGOGASTRODUODENOSCOPY  2024    [facility]    TUBAL LIGATION       Past Medical History:   Diagnosis Date    Anxiety     Bipolar disorder     Hypertension     Migraine      Social Determinants of Health     Tobacco Use: High Risk (3/26/2024)    Patient History     Smoking Tobacco Use: Every Day     Smokeless Tobacco Use: Never     Passive Exposure: Not on file   Alcohol Use: Not At Risk (2024)    AUDIT-C     Frequency of Alcohol Consumption: Monthly or less     Average Number of Drinks: Patient does not drink     Frequency of Binge Drinking: Never   Financial Resource Strain: Medium Risk (2024)    Overall Financial Resource Strain (CARDIA)     Difficulty of Paying Living Expenses: Somewhat hard   Food Insecurity: No Food Insecurity (2024)    Hunger Vital Sign     Worried About Running Out of Food in the Last Year: Never true     Ran Out of Food in the Last Year: Never true   Transportation Needs: No Transportation Needs (2024)    PRAPARE - Transportation     Lack of Transportation (Medical): No     Lack of Transportation (Non-Medical): No   Physical Activity: Sufficiently Active (2024)    Exercise Vital Sign     Days of Exercise per Week: 4 days     Minutes of Exercise per Session: 60 min   Stress: Stress Concern Present (2024)    [employer] - Occupational Stress Questionnaire     Feeling of Stress : Very much   Social Connections: Unknown (2024)    Social Connection and Isolation  Panel [NHANES]     Frequency of Communication with Friends and Family: More than three times a week     Frequency of Social Gatherings with Friends and Family: Once a week     Attends [employer] Services: Not on file     Active Member of Clubs or Organizations: No     Attends Club or Organization Meetings: Patient declined     Marital Status:    Housing Stability: High Risk (2/22/2024)    Housing Stability Vital Sign     Unable to Pay for Housing in the Last Year: Yes     Number of Places Lived in the Last Year: 1     Unstable Housing in the Last Year: No   Depression: Low Risk  (2/15/2024)    Depression     Last PHQ-4: Flowsheet Data: 0     Review of patient's allergies indicates:   Allergen Reactions    Montelukast Rash    Sulfa (sulfonamide antibiotics) Rash     Current Outpatient Medications   Medication Instructions    albuterol (PROVENTIL/VENTOLIN HFA) 90 mcg/actuation inhaler 2 puffs, Inhalation, Every 4 hours PRN    amLODIPine (NORVASC) 10 mg, Oral, Daily    azithromycin (ZITHROMAX) 500 mg, Oral, Daily    budesonide-formoterol 160-4.5 mcg (SYMBICORT) 160-4.5 mcg/actuation HFAA 2 puffs, Inhalation, Every 12 hours    EMGALITY  mg/mL PnIj Inject 240mg (2 injections) subcutaneously at separate sites, once (loading dose). Start maintenance dose 28 days later.    EMGALITY  mg, Subcutaneous, Every 28 days    HYDROcodone-acetaminophen (NORCO) 5-325 mg per tablet 1 tablet, Oral, Every 6 hours PRN    ibuprofen (ADVIL,MOTRIN) 600 mg, Oral, Every 6 hours PRN    NURTEC 75 mg, Oral, Daily PRN, Place ODT tablet on the tongue; alternatively the ODT tablet may be placed under the tongue    OXcarbazepine (TRILEPTAL) 300 mg, Oral, 3 times daily    pantoprazole (PROTONIX) 40 mg, Oral, Daily    predniSONE (DELTASONE) 10 MG tablet 4 tab PO in AM x 2 days, 3 tab in AM x2 days, 2 tab in AM x 2 days, 1 tab in AM x 2 days then stop    prochlorperazine (COMPAZINE) 10 mg, Oral, Every 6 hours PRN    rizatriptan  (MAXALT) 10 MG tablet 1 tab PO PRN migraine. May repeat every 2 hours for max 3 tabs in 24 hours. Use no more than 10 days per month.    tiZANidine (ZANAFLEX) 4 MG tablet Half or full tablet by mouth at night as needed for muscle spasm    traZODone (DESYREL) 50 mg, Oral, Nightly PRN    varenicline (CHANTIX STARTING MONTH BOX) 0.5 mg (11)- 1 mg (42) tablet Take one 0.5mg tab by mouth once daily X3 days,then increase to one 0.5mg tab twice daily X4 days,then increase to one 1mg tab twice daily    venlafaxine 225 mg TR24 1 tablet, Oral, Daily         ENT ROS negative except as stated above.     Patient answers are not available for this visit.            Objective:      There were no vitals filed for this visit.    General: NAD, well appearing  Eyes: Normal conjunctiva and lids  Face: symmetric, nerve intact  Nose: The nose is without any evidence of any deformity. The nasal mucosa is moist. The septum is midline. There is no evidence of septal hematoma. The turbinates are enlarged.   Ears: The ears are with normal-appearing pinna. Examination of the canals is normal appearing bilaterally. There is no drainage or erythema noted. The tympanic membranes are normal appearing with pearly color, normal-appearing landmarks and normal light reflex. Hearing is grossly intact.  Mouth: No obvious abnormalities to the lips. The teeth are unremarkable. The gingivae are without any obvious evidence of infection or lesion. The oral mucosa is moist and pink. There are no obvious masses to the hard or soft palate. Dentures in place.   Oropharynx: The uvula is midline.  The tongue is midline. The posterior pharynx is without erythema or exudate. The tonsils are normal appearing.  Salivary glands: The salivary glands are symmetric and not enlarged, no masses  Neck: No lymphadenopathy, trachea midline, thryoid not enlarged.  Psych: Normal mood and affect.   Neuro: Grossly intact  Speech: fluent    SEPARATE PROCEDURE NOTE:  Anterior  rhinoscopy insufficient to account for patient's symptoms. After verbal consent obtained, bilaterally nasal cavities sprayed with phenylephrine and xylocaine.  A complete diagnostic nasal endoscopy was performed to visualize the nasal cavities, the middle and superior meatus, the turbinates, and the sphenoethmoid recess bilaterally.  Flexible fiberoptic nasoendoscopy carried out and findings were: Turbinates hypertrophy and inflamed. Clear rhinorrhea bilaterally.     Test Review: I personally reviewed MRI report that was noted in the neurology message. I was not able to find the images or report in media.        Assessment and Plan:       1. Allergic rhinitis, unspecified seasonality, unspecified trigger    2. Chronic sinus complaints    3. Tobacco dependence    4. Moderate persistent asthma without complication    5. Gastroesophageal reflux disease, unspecified whether esophagitis present    6. PND (post-nasal drip)          RTC: 8 weeks   -if still having symptoms will perform laryngoscopy to evaluate cough further.  -no improvement in symptoms will order CT medtronic to evaluate for sinus disease  -follow-up with pulm due to the fact that she has a cough and having to use albuterol and neb treatments daily.     Plan of care was discussed in detail with the patient, who agreed with the plan as above. All questions were answered in detail. An hour was spent with the patient reviewing all medical history and current symptoms.     CRISTAL Molina  Otolaryngology

## 2024-05-22 ENCOUNTER — OFFICE VISIT (OUTPATIENT)
Dept: NEUROLOGY | Facility: CLINIC | Age: 39
End: 2024-05-22
Payer: COMMERCIAL

## 2024-05-22 VITALS
WEIGHT: 190.5 LBS | HEIGHT: 62 IN | DIASTOLIC BLOOD PRESSURE: 83 MMHG | BODY MASS INDEX: 35.06 KG/M2 | HEART RATE: 105 BPM | RESPIRATION RATE: 18 BRPM | SYSTOLIC BLOOD PRESSURE: 122 MMHG

## 2024-05-22 DIAGNOSIS — G44.40 MEDICATION OVERUSE HEADACHE: ICD-10-CM

## 2024-05-22 DIAGNOSIS — G43.719 INTRACTABLE CHRONIC MIGRAINE WITHOUT AURA AND WITHOUT STATUS MIGRAINOSUS: Primary | ICD-10-CM

## 2024-05-22 PROCEDURE — 1160F RVW MEDS BY RX/DR IN RCRD: CPT | Mod: CPTII,S$GLB,, | Performed by: NURSE PRACTITIONER

## 2024-05-22 PROCEDURE — 1159F MED LIST DOCD IN RCRD: CPT | Mod: CPTII,S$GLB,, | Performed by: NURSE PRACTITIONER

## 2024-05-22 PROCEDURE — 99999 PR PBB SHADOW E&M-EST. PATIENT-LVL IV: CPT | Mod: PBBFAC,,, | Performed by: NURSE PRACTITIONER

## 2024-05-22 PROCEDURE — 3074F SYST BP LT 130 MM HG: CPT | Mod: CPTII,S$GLB,, | Performed by: NURSE PRACTITIONER

## 2024-05-22 PROCEDURE — 3044F HG A1C LEVEL LT 7.0%: CPT | Mod: CPTII,S$GLB,, | Performed by: NURSE PRACTITIONER

## 2024-05-22 PROCEDURE — 4010F ACE/ARB THERAPY RXD/TAKEN: CPT | Mod: CPTII,S$GLB,, | Performed by: NURSE PRACTITIONER

## 2024-05-22 PROCEDURE — 3079F DIAST BP 80-89 MM HG: CPT | Mod: CPTII,S$GLB,, | Performed by: NURSE PRACTITIONER

## 2024-05-22 PROCEDURE — 3008F BODY MASS INDEX DOCD: CPT | Mod: CPTII,S$GLB,, | Performed by: NURSE PRACTITIONER

## 2024-05-22 PROCEDURE — 99213 OFFICE O/P EST LOW 20 MIN: CPT | Mod: S$GLB,,, | Performed by: NURSE PRACTITIONER

## 2024-05-22 NOTE — PROGRESS NOTES
Date of service: 5/22/2024  Referring provider: No ref. provider found    Subjective:      Chief complaint: Headache (Follow up)       Patient ID: Monica Durant is a 38 y.o. female with bipolar, chronic migraine, anxiety, GERD, asthma, HTN who presents for follow up of headache     History of Present Illness    INTERVAL HISTORY 5/22/24    Last visit was three months ago and at that time I ordered and MRI and PT and we started Emgality. Brain MRI showed sinus issues otherwise normal. She is now followed by ENT for sinus issues.    Today she reports she is better. Current pain 2 with range 1-10. She has two headache days per week. She takes Nurtec and rizatriptan 2-3 days per week. No side effects to Emgality. Otherwise information below is reviewed and verified with no changes made     ORIGINAL HEADACHE HISTORY - 2/22/24  Age at onset and course over time: mid 20's began with infrequent migraines. She had severe attacks once every 4-5 months. Severity and frequency has increased as she has gotten older.   Family history of migraines - mom  Last eye exam - a little over a year ago  Location: generally right temple  Quality:  [x] pressure [x] tight [x] throbbing [x] sharp [x] stabbing   Severity: current 3 with range 2-10  Duration: days  Frequency: daily  Headaches awaken at night?:  no  Worst time of day: all day  Associated with: [x] photophobia [x]  phonophobia [] osmophobia [] blurred vision  [] double vision [x] loss of appetite [x] nausea [x] vomiting [] dizziness [] vertigo  [] tinnitus [] irritability [] sinus pressure [x] problems with concentration   [x] neck tightness   Alleviated by:  [x] sleep [x] darkness [x] massage [] heat [x] ice [x] medication  Exacerbated by:  [x] fatigue [x] light [x] noise [] smells [x] coughing [] sneezing  [] bending over [] ovulation [] menses [] alcohol [x] change in weather [x]  stress  Ipsilateral autonomic: [] nasal congestion [] lacrimation [] ptosis [] injection [] edema  [] foreign body sensation [] ear fullness   ICP:  [] transient visual obscurations  [] tinnitus   [] positional headache  [x] non-positional   Sleep habits: trouble staying asleep, unrefreshing sleep, has had sleep study which showed no sleep apnea  Caffeine intake: 10 (combination of espresso and coke zero)  Gyn status (if female): tubal ligation  HIT 6: 68    Current acute treatment:  Rizatriptan  Nurtec   Tylenol - reduced to 2-3 days per week  Aleve - reduced to 2-3 days per week    Current prevention:  Venlafaxine  Trazodone  Trileptal  Amlodipine   Emgality - first February 2024    Previously tried/failed acute treatment:  Excedrin  Tylenol  Mobic  Nurtec  Ubrelvy  Sumatriptan - 3 days per week  Ibuprofen - daily    Previously tried/failed preventative treatment:  Tegretol - not effective  Topamax - not effective   Atenolol  Nebivolol  Fluoxetine  Wellbutrin  Gabapentin   Propranolol - for headaches, not effective   Botox - one session by Dr. Gordon over one year ago  Aimovig   Emgality    Review of patient's allergies indicates:   Allergen Reactions    Montelukast Rash    Sulfa (sulfonamide antibiotics) Rash     Current Outpatient Medications   Medication Sig Dispense Refill    albuterol (PROVENTIL/VENTOLIN HFA) 90 mcg/actuation inhaler Inhale 2 puffs into the lungs every 4 (four) hours as needed.      amLODIPine (NORVASC) 10 MG tablet Take 10 mg by mouth once daily.      budesonide-formoterol 160-4.5 mcg (SYMBICORT) 160-4.5 mcg/actuation HFAA Inhale 2 puffs into the lungs every 12 (twelve) hours.      galcanezumab-gnlm (EMGALITY PEN) 120 mg/mL PnIj Inject 1 mL (120 mg total) into the skin every 28 days. 1 mL 11    ibuprofen (ADVIL,MOTRIN) 600 MG tablet Take 1 tablet (600 mg total) by mouth every 6 (six) hours as needed for Pain. 20 tablet 0    lumateperone tosylate (CAPLYTA ORAL) Take 20 mg by mouth.      OXcarbazepine (TRILEPTAL) 300 MG Tab Take 300 mg by mouth 3 (three) times daily.      pantoprazole  (PROTONIX) 40 MG tablet Take 40 mg by mouth once daily.      prochlorperazine (COMPAZINE) 10 MG tablet Take 1 tablet (10 mg total) by mouth every 6 (six) hours as needed (migraine or nausea). 60 tablet 11    rimegepant (NURTEC) 75 mg odt Take 1 tablet (75 mg total) by mouth daily as needed for Migraine. Place ODT tablet on the tongue; alternatively the ODT tablet may be placed under the tongue 16 tablet 11    rizatriptan (MAXALT) 10 MG tablet 1 tab PO PRN migraine. May repeat every 2 hours for max 3 tabs in 24 hours. Use no more than 10 days per month. 18 tablet 11    tiZANidine (ZANAFLEX) 4 MG tablet Half or full tablet by mouth at night as needed for muscle spasm 30 tablet 11    traZODone (DESYREL) 50 MG tablet Take 50 mg by mouth nightly as needed.      venlafaxine 225 mg TR24 Take 1 tablet by mouth Daily.       No current facility-administered medications for this visit.       Past Medical History  Past Medical History:   Diagnosis Date    Anxiety     Bipolar disorder     Hypertension     Migraine        Past Surgical History  Past Surgical History:   Procedure Laterality Date     SECTION      ESOPHAGOGASTRODUODENOSCOPY  2024    [facility]    TUBAL LIGATION         Family History  Family History   Problem Relation Name Age of Onset    Hypertension Mother      Heart disease Father         Social History  Social History     Socioeconomic History    Marital status:    Tobacco Use    Smoking status: Every Day     Current packs/day: 1.00     Types: Cigarettes    Smokeless tobacco: Never   Substance and Sexual Activity    Alcohol use: Not Currently    Drug use: Yes     Types: Marijuana     Social Determinants of Health     Financial Resource Strain: Medium Risk (2024)    Overall Financial Resource Strain (CARDIA)     Difficulty of Paying Living Expenses: Somewhat hard   Food Insecurity: No Food Insecurity (2024)    Hunger Vital Sign     Worried About Running Out of Food in the Last  Year: Never true     Ran Out of Food in the Last Year: Never true   Transportation Needs: No Transportation Needs (2/22/2024)    PRAPARE - Transportation     Lack of Transportation (Medical): No     Lack of Transportation (Non-Medical): No   Physical Activity: Sufficiently Active (2/22/2024)    Exercise Vital Sign     Days of Exercise per Week: 4 days     Minutes of Exercise per Session: 60 min   Stress: Stress Concern Present (2/22/2024)    [employer] [employer] - Occupational Stress Questionnaire     Feeling of Stress : Very much   Housing Stability: High Risk (2/22/2024)    Housing Stability Vital Sign     Unable to Pay for Housing in the Last Year: Yes     Number of Places Lived in the Last Year: 1     Unstable Housing in the Last Year: No        Objective:        Vitals:    05/22/24 0906   BP: 122/83   Pulse: 105   Resp: 18       Body mass index is 34.84 kg/m².    5/22/24  Constitutional:   She appears well-developed and well-nourished. She is well groomed     Neurological Exam:  General: well-developed, well-nourished, no distress  Mental status: Awake and alert  Speech language: No dysarthria or aphasia on conversation  Cranial nerves: Face symmetric  Motor: Moves all extremities well  Coordination: No ataxia. No tremor.    2/22/24  Constitutional: appears in no acute distress, well-developed, well-nourished     Eyes: normal conjunctiva, PERRLA    Ears, nose, mouth, throat: external appearance of ears and nose normal, hearing intact     Cardiovascular: regular rate and rhythm, no murmurs appreciated    Respiratory: unlabored respirations, breath sounds normal bilaterally    Gastrointestinal: no visible abdominal masses, no guarding, no visible hernia    Musculoskeletal: normal tone in all four extremities. No abnormal movements. No pronator drift. No orbit. Symmetric finger tapping. Normal station. Normal regular gait. Normal tandem gait.      Spine:   CERVICAL SPINE:  ROM: normal   MUSCLE  "SPASM: in all planes    FACET LOADING: bilateral    SPURLING: no  FREDI / RICHMNOD tender: no     Psychiatric: normal judgment and insight. Oriented to person, place, and time.     Neurologic:   Cortical functions: recent and remote memory intact, normal attention span and concentration, speech fluent, adequate fund of knowledge   Cranial nerves: visual fields full, PERRLA, EOMI, symmetric facial strength, hearing intact, palate elevates symmetrically, shoulder shrug 5/5, tongue protrudes midline   Reflexes: 2+ in the upper and lower extremities, no Nieto  Sensation: intact to temperature throughout   Coordination: normal finger to nose, heel to shin    Data Review:     I have personally reviewed the referring provider's notes, labs, & imaging made available to me today.      RADIOLOGY STUDIES:  I have personally reviewed the pertinent images performed.       No results found for this or any previous visit.    Lab Results   Component Value Date     02/16/2024    K 4.1 02/16/2024     02/16/2024    CO2 23 02/16/2024    BUN 7 02/16/2024    CREATININE 0.7 02/16/2024    GLU 93 02/16/2024    HGBA1C 5.2 02/16/2024    AST 15 02/16/2024    ALT 16 02/16/2024    ALBUMIN 3.9 02/16/2024    PROT 6.4 02/16/2024    BILITOT 0.3 02/16/2024    CHOL 232 (H) 02/16/2024    HDL 45 02/16/2024    LDLCALC 150.4 02/16/2024    TRIG 183 (H) 02/16/2024       Lab Results   Component Value Date    WBC 9.70 02/16/2024    HGB 14.9 02/16/2024    HCT 45.7 02/16/2024    MCV 94 02/16/2024     02/16/2024       No results found for: "TSH"        Assessment & Plan:       Problem List Items Addressed This Visit          Neuro    Intractable chronic migraine without aura and without status migrainosus - Primary    Overview     Migraine headaches since her 20's. Headaches are typically unilateral, moderate to severe in intensity, worsen with activity, pounding in quality and associated with sensitivity to light and sound.   We discussed options " for prevention to include CGRP vs Botox. Will start with CGRP. Galcanezumab (Emgality) treatment was approved for the prevention of acute and chronic migraine on 9/27/2018. The patient will be an ideal candidate for Emgality. We are planning for 3 treatments 28 days apart. If we see no improvement after 3 treatments, we will discontinue the injections.   For acute attacks, would ideally start Nurtec.          Current Assessment & Plan     She has been on Emgality for three months with at least 50% reduction in headache days. Continue current plan.          Medication overuse headache    Overview     Resolved. She was previously taking daily ibuprofen for at least past year. Discussed nature of diagnosis and need to stop all OTC to see any improvement in migraine frequency. Add prednisone course and compazine. Lifestyle habits discussed as she does drink high amounts of caffeine daily                   Please call our clinic at [phone] or send a message on the [email or web address] portal if there are any changes to the plan described below, for example,if you are not contacted for the requested tests, referral(s) within one week, if you are unable to receive the medications prescribed, or if you feel you need to change the treatment course for any reason.     TESTING:  -- none     REFERRALS:  -- none     PREVENTION (use daily regardless of headache):  -- continue Emgality injection once every 28 days   -- continue magnesium in ONE of the following preparations -               1. Magnesium oxide 800mg daily (the most common over the counter kind, may causes loose stools)              2. Magnesium citrate 400-500mg daily (harder to find, but more neutral on the bowels)              3. Magnesium glycinate 400mg daily (hardest to find, look online, but most bowel-neutral, best absorbed)     AS-NEEDED TREATMENT (use total no more than 10 days per month unless otherwise stated):  -- continue rizatriptan with next migraine. You can  repeat two hours later if needed  -- continue Nurtec with next migraine. This dissolves under the tongue and is only taken once in 24 hour time frame.  -- continue tizanidine at night. This is a muscle relaxer and it will also make you potentially sleepy. Start with half a tablet to see how you respond but can take up to a whole tablet if needed  -- continue compazine. This is a nausea medication that also has anti-migraine properties. Can take daily and will not contribute to rebound headaches    Follow up in about 4 months (around 9/22/2024).    Alma Rosa Contreras, NP

## 2024-05-22 NOTE — ASSESSMENT & PLAN NOTE
She has been on Emgality for three months with at least 50% reduction in headache days. Continue current plan.

## 2024-05-22 NOTE — PATIENT INSTRUCTIONS
Please call our clinic at [phone] or send a message on the [email or web address] portal if there are any changes to the plan described below, for example,if you are not contacted for the requested tests, referral(s) within one week, if you are unable to receive the medications prescribed, or if you feel you need to change the treatment course for any reason.     TESTING:  -- none     REFERRALS:  -- none     PREVENTION (use daily regardless of headache):  -- continue Emgality injection once every 28 days   -- continue magnesium in ONE of the following preparations -               1. Magnesium oxide 800mg daily (the most common over the counter kind, may causes loose stools)              2. Magnesium citrate 400-500mg daily (harder to find, but more neutral on the bowels)              3. Magnesium glycinate 400mg daily (hardest to find, look online, but most bowel-neutral, best absorbed)     AS-NEEDED TREATMENT (use total no more than 10 days per month unless otherwise stated):  -- continue rizatriptan with next migraine. You can repeat two hours later if needed  -- continue Nurtec with next migraine. This dissolves under the tongue and is only taken once in 24 hour time frame.  -- continue tizanidine at night. This is a muscle relaxer and it will also make you potentially sleepy. Start with half a tablet to see how you respond but can take up to a whole tablet if needed  -- continue compazine. This is a nausea medication that also has anti-migraine properties. Can take daily and will not contribute to rebound headaches

## 2024-06-04 ENCOUNTER — LAB VISIT (OUTPATIENT)
Dept: LAB | Facility: HOSPITAL | Age: 39
End: 2024-06-04
Attending: INTERNAL MEDICINE
Payer: COMMERCIAL

## 2024-06-04 ENCOUNTER — OFFICE VISIT (OUTPATIENT)
Dept: OTOLARYNGOLOGY | Facility: CLINIC | Age: 39
End: 2024-06-04
Payer: COMMERCIAL

## 2024-06-04 VITALS — WEIGHT: 190.94 LBS | HEIGHT: 62 IN | BODY MASS INDEX: 35.14 KG/M2

## 2024-06-04 DIAGNOSIS — R09.82 PND (POST-NASAL DRIP): ICD-10-CM

## 2024-06-04 DIAGNOSIS — R05.3 CHRONIC COUGH: Primary | ICD-10-CM

## 2024-06-04 DIAGNOSIS — K21.9 GASTROESOPHAGEAL REFLUX DISEASE, UNSPECIFIED WHETHER ESOPHAGITIS PRESENT: ICD-10-CM

## 2024-06-04 DIAGNOSIS — B37.0 THRUSH: ICD-10-CM

## 2024-06-04 DIAGNOSIS — J38.2 NODULES OF VOCAL CORDS: ICD-10-CM

## 2024-06-04 DIAGNOSIS — R09.89 CHRONIC SINUS COMPLAINTS: ICD-10-CM

## 2024-06-04 DIAGNOSIS — J30.9 ALLERGIC RHINITIS, UNSPECIFIED SEASONALITY, UNSPECIFIED TRIGGER: ICD-10-CM

## 2024-06-04 DIAGNOSIS — R49.0 DYSPHONIA: ICD-10-CM

## 2024-06-04 DIAGNOSIS — F17.210 TOBACCO DEPENDENCE DUE TO CIGARETTES: ICD-10-CM

## 2024-06-04 PROCEDURE — 99999 PR PBB SHADOW E&M-EST. PATIENT-LVL III: CPT | Mod: PBBFAC,,, | Performed by: NURSE PRACTITIONER

## 2024-06-04 PROCEDURE — 36415 COLL VENOUS BLD VENIPUNCTURE: CPT | Mod: PO | Performed by: NURSE PRACTITIONER

## 2024-06-04 PROCEDURE — 99214 OFFICE O/P EST MOD 30 MIN: CPT | Mod: 25,S$GLB,, | Performed by: NURSE PRACTITIONER

## 2024-06-04 PROCEDURE — 31575 DIAGNOSTIC LARYNGOSCOPY: CPT | Mod: S$GLB,,, | Performed by: NURSE PRACTITIONER

## 2024-06-04 PROCEDURE — 1159F MED LIST DOCD IN RCRD: CPT | Mod: CPTII,S$GLB,, | Performed by: NURSE PRACTITIONER

## 2024-06-04 PROCEDURE — 86003 ALLG SPEC IGE CRUDE XTRC EA: CPT | Mod: 59 | Performed by: NURSE PRACTITIONER

## 2024-06-04 PROCEDURE — 3008F BODY MASS INDEX DOCD: CPT | Mod: CPTII,S$GLB,, | Performed by: NURSE PRACTITIONER

## 2024-06-04 PROCEDURE — 4010F ACE/ARB THERAPY RXD/TAKEN: CPT | Mod: CPTII,S$GLB,, | Performed by: NURSE PRACTITIONER

## 2024-06-04 PROCEDURE — 86003 ALLG SPEC IGE CRUDE XTRC EA: CPT | Performed by: NURSE PRACTITIONER

## 2024-06-04 PROCEDURE — 3044F HG A1C LEVEL LT 7.0%: CPT | Mod: CPTII,S$GLB,, | Performed by: NURSE PRACTITIONER

## 2024-06-04 RX ORDER — FLUCONAZOLE 100 MG/1
100 TABLET ORAL DAILY
Qty: 7 TABLET | Refills: 0 | Status: CANCELLED | OUTPATIENT
Start: 2024-06-04 | End: 2024-06-11

## 2024-06-04 RX ORDER — FAMOTIDINE 40 MG/1
40 TABLET, FILM COATED ORAL DAILY
Qty: 30 TABLET | Refills: 3 | Status: SHIPPED | OUTPATIENT
Start: 2024-06-04 | End: 2025-06-04

## 2024-06-04 NOTE — PROGRESS NOTES
"Otolaryngology Clinic Note    Subjective:       Patient ID: Monica Durant is a 38 y.o. female.    Chief Complaint: Follow-up      History of Present Illness: Monica Durant is a 38 y.o. female presenting with pnd.     Patient has known migraines and is being followed by neurology. At her last appointment  Kj Contreras NP ordered an MRI. Per neurology's note: Received MRI reports from [facility]. Brain impression "no evidence of acute infarction, acute hydrocephalus, or mass effect. There is no abnormal enhancement. Mild mucosal disease of the sphenoid sinuses and ethmoid air cells." The NP belives some of her headache could be caused by her sinus issues and referred her here.     She states she has always had sinus issues. She states she gets sick every month or at least a couple she is diagnosed with a sinus infection. She normally goes to walk in clinics in the last. She was treated for pneumonia 3/8/24 (She was given azithromycin and amoxicillin). She is on symbicort. He is using her albuterol 1-2x a day for the last 2 months. She is also doing breathing treatments 2x a day for the last 3 weeks. She states she had a CT of her chest at the end of February that showed pneumonia. She has asthma and sees Dr. Draper, she sees her every 6 months. She has not contacted their office since this illness. She states after completion of her 2 antibiotics she did not see any improvement in her symptoms. She has not called to notify PCP or pulmonary about this.     She gets coughing with mucous production, blood streaks in her nasal mucous, pnd, headache, congestion, occ fever. She denies any sinus pain or pressure, tooth pain, itchy eyes, watery eyes. She has been allergy tested around a year ago by her pulm: pollen. She did not require any allergy shots. She takes it daily, xyzal. She is allergic to singulair. She has tried flonase in the past and uses as needed. She uses it a few a week. She does use a saline spray a few times a " week as well.     She had a PND, cough (productive now, clear today), she states she vomits sometimes due to the mucous. She feels like cough is more from the PND. She is having wheezing as well. She denies any fever. She denies any current headache, sinus pain or pressure. She denies any congestion but does have a runny nose. She has reflux and takes protonix.     Interval 24: Since last visit she did see GI on  and pulm on . She saw GI and also has early signs of Kirby's. She takes Protonix twice daily. Pulm said she may have esophelic asthma. She also had a follow-up with her neurologist . She was started on nasal sprays twice daily and told to continue her oral antihistamine. She states she has no improvement in symptoms. She sometimes feels a PND daily. She occ has congestion 1-2x a week. She denies a runny nose. She denies any sneezing or itchy watery eyes. She does have hoarseness that comes and goes depending in how much she has been coughing for the last 6 months. She also did have thrush on her tongue a week ago and started using mouth rinse and helped. She is not using her inhalers since the last week due to the thrush. She states she can not tolerate nystain rinse because it makes her vomit.     Past Surgical History:   Procedure Laterality Date     SECTION      ESOPHAGOGASTRODUODENOSCOPY  2024    [facility]    TUBAL LIGATION       Past Medical History:   Diagnosis Date    Anxiety     Bipolar disorder     Hypertension     Migraine      Social Determinants of Health     Tobacco Use: High Risk (2024)    Patient History     Smoking Tobacco Use: Every Day     Smokeless Tobacco Use: Never     Passive Exposure: Not on file   Alcohol Use: Not At Risk (2024)    AUDIT-C     Frequency of Alcohol Consumption: Monthly or less     Average Number of Drinks: Patient does not drink     Frequency of Binge Drinking: Never   Financial Resource Strain: Medium Risk (2024)     Overall Financial Resource Strain (CARDIA)     Difficulty of Paying Living Expenses: Somewhat hard   Food Insecurity: No Food Insecurity (2/22/2024)    Hunger Vital Sign     Worried About Running Out of Food in the Last Year: Never true     Ran Out of Food in the Last Year: Never true   Transportation Needs: No Transportation Needs (2/22/2024)    PRAPARE - Transportation     Lack of Transportation (Medical): No     Lack of Transportation (Non-Medical): No   Physical Activity: Sufficiently Active (2/22/2024)    Exercise Vital Sign     Days of Exercise per Week: 4 days     Minutes of Exercise per Session: 60 min   Stress: Stress Concern Present (2/22/2024)    [employer] [place] [employer] - Occupational Stress Questionnaire     Feeling of Stress : Very much   Housing Stability: High Risk (2/22/2024)    Housing Stability Vital Sign     Unable to Pay for Housing in the Last Year: Yes     Number of Places Lived in the Last Year: 1     Unstable Housing in the Last Year: No   Depression: Low Risk  (2/15/2024)    Depression     Last PHQ-4: Flowsheet Data: 0   Utilities: Not on file   Health Literacy: Not on file   Social Isolation: Not on file     Review of patient's allergies indicates:   Allergen Reactions    Montelukast Rash    Sulfa (sulfonamide antibiotics) Rash     Current Outpatient Medications   Medication Instructions    albuterol (PROVENTIL/VENTOLIN HFA) 90 mcg/actuation inhaler 2 puffs, Every 4 hours PRN    amLODIPine (NORVASC) 10 mg, Oral, Daily    budesonide-formoterol 160-4.5 mcg (SYMBICORT) 160-4.5 mcg/actuation HFAA 2 puffs, Inhalation, Every 12 hours    EMGALITY  mg, Subcutaneous, Every 28 days    famotidine (PEPCID) 40 mg, Oral, Daily    ibuprofen (ADVIL,MOTRIN) 600 mg, Oral, Every 6 hours PRN    lumateperone tosylate (CAPLYTA ORAL) 20 mg, Oral    NURTEC 75 mg, Oral, Daily PRN, Place ODT tablet on the tongue; alternatively the ODT tablet may be placed under the tongue    OXcarbazepine  (TRILEPTAL) 300 mg, Oral, 3 times daily    pantoprazole (PROTONIX) 40 mg, Oral, Daily    prochlorperazine (COMPAZINE) 10 mg, Oral, Every 6 hours PRN    rizatriptan (MAXALT) 10 MG tablet 1 tab PO PRN migraine. May repeat every 2 hours for max 3 tabs in 24 hours. Use no more than 10 days per month.    tiZANidine (ZANAFLEX) 4 MG tablet Half or full tablet by mouth at night as needed for muscle spasm    traZODone (DESYREL) 50 mg, Oral, Nightly PRN    venlafaxine 225 mg TR24 1 tablet, Oral, Daily         ENT ROS negative except as stated above.     Patient answers are not available for this visit.            Objective:      There were no vitals filed for this visit.    General: NAD, well appearing  Eyes: Normal conjunctiva and lids  Face: symmetric, nerve intact  Nose: The nose is without any evidence of any deformity. The nasal mucosa is moist. The septum is midline. There is no evidence of septal hematoma. The turbinates are enlarged.   Ears: The ears are with normal-appearing pinna. Examination of the canals is normal appearing bilaterally. There is no drainage or erythema noted. The tympanic membranes are normal appearing with pearly color, normal-appearing landmarks and normal light reflex. Hearing is grossly intact.  Mouth: No obvious abnormalities to the lips. The teeth are unremarkable. The gingivae are without any obvious evidence of infection or lesion. The oral mucosa is moist and pink. There are no obvious masses to the hard or soft palate. Dentures in place.   Oropharynx: The uvula is midline.  The tongue is midline. The posterior pharynx is without erythema or exudate. The tonsils are normal appearing.  Salivary glands: The salivary glands are symmetric and not enlarged, no masses  Neck: No lymphadenopathy, trachea midline, thryoid not enlarged.  Psych: Normal mood and affect.   Neuro: Grossly intact  Speech: fluent    Procedure: Flexible laryngoscopy  Indications: chronic cough with hoarseness  Verbal  consent obtained  Anesthesia: lidocaine and phenylephrine nasal spray  Procedure: Scope was passed into right or left nare, to nasopharynx and down to visualize the glottis. Findings below. Patient tolerated procedure well.     - Nose rhinitis changes, clear rhinorrhea, pale turbinates  - Nasopharynx- WNL, no masses, cobblestoning  - Oropharynx- BOT symmetric, no masses  - Hypopharynx and piriform sinuses- no masses on trumpet maneuver  - Supraglottis- Arytenoids intact, no masses, not edematous or erythematous  - Glottis- Bilateral vocal folds intact with full motion, no masses, possible thrush or VC nodules noted on VC bilaterally  - Glottic closure- complete         SEPARATE PROCEDURE NOTE: 3/26/24  Anterior rhinoscopy insufficient to account for patient's symptoms. After verbal consent obtained, bilaterally nasal cavities sprayed with phenylephrine and xylocaine.  A complete diagnostic nasal endoscopy was performed to visualize the nasal cavities, the middle and superior meatus, the turbinates, and the sphenoethmoid recess bilaterally.  Flexible fiberoptic nasoendoscopy carried out and findings were: Turbinates hypertrophy and inflamed. Clear rhinorrhea bilaterally.     Test Review: I personally reviewed MRI report that was noted in the neurology message. I was not able to find the images or report in media.        Assessment and Plan:       1. Chronic cough    2. Chronic sinus complaints    3. Gastroesophageal reflux disease, unspecified whether esophagitis present    4. Allergic rhinitis, unspecified seasonality, unspecified trigger    5. PND (post-nasal drip)    6. Thrush    7. Nodules of vocal cords    8. Tobacco dependence due to cigarettes    9. Dysphonia    -possible VC nodules but may also just be thrush      - Will get copy of recent EKG from last month and if normal will send in diflucan 100mg x7 days  - Start Pepcid 40mg at night  - Continue Flonase,Astelin, and saline rinse twice daily  - Continue  follow-up with pulm for cough  - Continue follow-up with GI  - May add saline to neb   - Try to cut back on tobacco  - Allergy testing today    RTC: 8 weeks with surgeon for repeat scope     Plan of care was discussed in detail with the patient, who agreed with the plan as above. All questions were answered in detail. An hour was spent with the patient reviewing all medical history and current symptoms.     BEVERLY Molina-ALVA  Otolaryngology     Scope reviewed with Dr. Guerrero and aggress with plan.

## 2024-06-07 ENCOUNTER — TELEPHONE (OUTPATIENT)
Dept: OTOLARYNGOLOGY | Facility: CLINIC | Age: 39
End: 2024-06-07
Payer: COMMERCIAL

## 2024-06-07 LAB
A FUMIGATUS IGE QN: <0.1 KU/L
BERMUDA GRASS IGE QN: <0.1 KU/L
CAT DANDER IGE QN: <0.1 KU/L
COMMON RAGWEED IGE QN: <0.1 KU/L
D FARINAE IGE QN: <0.1 KU/L
D PTERONYSS IGE QN: <0.1 KU/L
DEPRECATED A FUMIGATUS IGE RAST QL: NORMAL
DEPRECATED BERMUDA GRASS IGE RAST QL: NORMAL
DEPRECATED CAT DANDER IGE RAST QL: NORMAL
DEPRECATED COMMON RAGWEED IGE RAST QL: NORMAL
DEPRECATED D FARINAE IGE RAST QL: NORMAL
DEPRECATED D PTERONYSS IGE RAST QL: NORMAL
DEPRECATED DOG DANDER IGE RAST QL: NORMAL
DEPRECATED ELDER IGE RAST QL: NORMAL
DEPRECATED ELDER IGE RAST QL: NORMAL
DEPRECATED ENGL PLANTAIN IGE RAST QL: NORMAL
DEPRECATED JOHNSON GRASS IGE RAST QL: NORMAL
DEPRECATED LONDON PLANE IGE RAST QL: NORMAL
DEPRECATED MUGWORT IGE RAST QL: NORMAL
DEPRECATED PECAN/HICK TREE IGE RAST QL: NORMAL
DEPRECATED ROACH IGE RAST QL: NORMAL
DEPRECATED TIMOTHY IGE RAST QL: NORMAL
DEPRECATED WHITE OAK IGE RAST QL: NORMAL
DOG DANDER IGE QN: <0.1 KU/L
ELDER IGE QN: <0.1 KU/L
ELDER IGE QN: <0.1 KU/L
ENGL PLANTAIN IGE QN: <0.1 KU/L
JOHNSON GRASS IGE QN: <0.1 KU/L
LONDON PLANE IGE QN: <0.1 KU/L
MUGWORT IGE QN: <0.1 KU/L
PECAN/HICK TREE IGE QN: <0.1 KU/L
ROACH IGE QN: <0.1 KU/L
TIMOTHY IGE QN: <0.1 KU/L
WHITE OAK IGE QN: <0.1 KU/L

## 2024-06-07 RX ORDER — FLUCONAZOLE 100 MG/1
100 TABLET ORAL DAILY
Qty: 7 TABLET | Refills: 0 | Status: SHIPPED | OUTPATIENT
Start: 2024-06-07 | End: 2024-06-14

## 2024-06-07 NOTE — TELEPHONE ENCOUNTER
Called to speak with patient. Received recent EKG and okay to proceed with diflucan. She knows to go to the ER if she starts with any symptoms of not feeling right and will stop diflucan. Results from RAST testing reviewed but some are still pending.

## 2024-06-19 DIAGNOSIS — M79.18 CERVICAL MYOFASCIAL PAIN SYNDROME: ICD-10-CM

## 2024-06-19 RX ORDER — TIZANIDINE 4 MG/1
TABLET ORAL
Qty: 30 TABLET | Refills: 11 | Status: CANCELLED | OUTPATIENT
Start: 2024-06-19

## 2024-06-27 ENCOUNTER — PATIENT MESSAGE (OUTPATIENT)
Dept: NEUROLOGY | Facility: CLINIC | Age: 39
End: 2024-06-27
Payer: COMMERCIAL

## 2024-07-30 ENCOUNTER — OFFICE VISIT (OUTPATIENT)
Dept: OTOLARYNGOLOGY | Facility: CLINIC | Age: 39
End: 2024-07-30
Payer: COMMERCIAL

## 2024-07-30 VITALS — WEIGHT: 187.38 LBS | BODY MASS INDEX: 34.48 KG/M2 | HEIGHT: 62 IN

## 2024-07-30 DIAGNOSIS — F17.200 TOBACCO DEPENDENCE: Primary | ICD-10-CM

## 2024-07-30 DIAGNOSIS — K21.9 GASTROESOPHAGEAL REFLUX DISEASE, UNSPECIFIED WHETHER ESOPHAGITIS PRESENT: ICD-10-CM

## 2024-07-30 DIAGNOSIS — J45.40 MODERATE PERSISTENT ASTHMA WITHOUT COMPLICATION: ICD-10-CM

## 2024-07-30 DIAGNOSIS — R05.3 CHRONIC COUGH: ICD-10-CM

## 2024-07-30 DIAGNOSIS — R49.0 DYSPHONIA: ICD-10-CM

## 2024-07-30 DIAGNOSIS — R09.82 PND (POST-NASAL DRIP): ICD-10-CM

## 2024-07-30 DIAGNOSIS — J38.2 NODULES OF VOCAL CORDS: ICD-10-CM

## 2024-07-30 PROCEDURE — 31575 DIAGNOSTIC LARYNGOSCOPY: CPT | Mod: S$GLB,,, | Performed by: STUDENT IN AN ORGANIZED HEALTH CARE EDUCATION/TRAINING PROGRAM

## 2024-07-30 PROCEDURE — 1159F MED LIST DOCD IN RCRD: CPT | Mod: CPTII,S$GLB,, | Performed by: STUDENT IN AN ORGANIZED HEALTH CARE EDUCATION/TRAINING PROGRAM

## 2024-07-30 PROCEDURE — 99999 PR PBB SHADOW E&M-EST. PATIENT-LVL III: CPT | Mod: PBBFAC,,, | Performed by: STUDENT IN AN ORGANIZED HEALTH CARE EDUCATION/TRAINING PROGRAM

## 2024-07-30 PROCEDURE — 99214 OFFICE O/P EST MOD 30 MIN: CPT | Mod: 25,S$GLB,, | Performed by: STUDENT IN AN ORGANIZED HEALTH CARE EDUCATION/TRAINING PROGRAM

## 2024-07-30 PROCEDURE — 4010F ACE/ARB THERAPY RXD/TAKEN: CPT | Mod: CPTII,S$GLB,, | Performed by: STUDENT IN AN ORGANIZED HEALTH CARE EDUCATION/TRAINING PROGRAM

## 2024-07-30 PROCEDURE — 3008F BODY MASS INDEX DOCD: CPT | Mod: CPTII,S$GLB,, | Performed by: STUDENT IN AN ORGANIZED HEALTH CARE EDUCATION/TRAINING PROGRAM

## 2024-07-30 PROCEDURE — 3044F HG A1C LEVEL LT 7.0%: CPT | Mod: CPTII,S$GLB,, | Performed by: STUDENT IN AN ORGANIZED HEALTH CARE EDUCATION/TRAINING PROGRAM

## 2024-07-30 RX ORDER — DICLOFENAC SODIUM 75 MG/1
75 TABLET, DELAYED RELEASE ORAL 2 TIMES DAILY
COMMUNITY
Start: 2024-05-06

## 2024-07-30 RX ORDER — TRAMADOL HYDROCHLORIDE 50 MG/1
TABLET ORAL
COMMUNITY
Start: 2024-07-23

## 2024-07-30 RX ORDER — FAMOTIDINE 40 MG/1
40 TABLET, FILM COATED ORAL NIGHTLY
Qty: 90 TABLET | Refills: 3 | Status: SHIPPED | OUTPATIENT
Start: 2024-07-30 | End: 2025-07-30

## 2024-07-30 RX ORDER — OLMESARTAN MEDOXOMIL 20 MG/1
20 TABLET ORAL
COMMUNITY
Start: 2024-06-27

## 2024-07-30 RX ORDER — ROSUVASTATIN CALCIUM 10 MG/1
10 TABLET, COATED ORAL
COMMUNITY
Start: 2024-06-27

## 2024-07-30 RX ORDER — LUMATEPERONE 21 MG/1
1 CAPSULE ORAL
COMMUNITY
Start: 2024-06-27

## 2024-07-30 RX ORDER — ESOMEPRAZOLE MAGNESIUM 40 MG/1
40 CAPSULE, DELAYED RELEASE ORAL
COMMUNITY
Start: 2024-07-26

## 2024-07-30 NOTE — PROGRESS NOTES
"Otolaryngology Clinic Note    Subjective:       Patient ID: Monica Durant is a 39 y.o. female.    Chief Complaint: Follow-up (sinus)      History of Present Illness: Monica Durant is a 39 y.o. female presenting with pnd.     Patient has known migraines and is being followed by neurology. At her last appointment  Kj Contreras NP ordered an MRI. Per neurology's note: Received MRI reports from [facility]. Brain impression "no evidence of acute infarction, acute hydrocephalus, or mass effect. There is no abnormal enhancement. Mild mucosal disease of the sphenoid sinuses and ethmoid air cells." The NP belives some of her headache could be caused by her sinus issues and referred her here.     She states she has always had sinus issues. She states she gets sick every month or at least a couple she is diagnosed with a sinus infection. She normally goes to walk in clinics in the last. She was treated for pneumonia 3/8/24 (She was given azithromycin and amoxicillin). She is on symbicort. He is using her albuterol 1-2x a day for the last 2 months. She is also doing breathing treatments 2x a day for the last 3 weeks. She states she had a CT of her chest at the end of February that showed pneumonia. She has asthma and sees Dr. Draper, she sees her every 6 months. She has not contacted their office since this illness. She states after completion of her 2 antibiotics she did not see any improvement in her symptoms. She has not called to notify PCP or pulmonary about this.     She gets coughing with mucous production, blood streaks in her nasal mucous, pnd, headache, congestion, occ fever. She denies any sinus pain or pressure, tooth pain, itchy eyes, watery eyes. She has been allergy tested around a year ago by her pulm: pollen. She did not require any allergy shots. She takes it daily, xyzal. She is allergic to singulair. She has tried flonase in the past and uses as needed. She uses it a few a week. She does use a saline spray a few " times a week as well.     She had a PND, cough (productive now, clear today), she states she vomits sometimes due to the mucous. She feels like cough is more from the PND. She is having wheezing as well. She denies any fever. She denies any current headache, sinus pain or pressure. She denies any congestion but does have a runny nose. She has reflux and takes protonix.      24: Since last visit she did see GI on  and pulm on . She saw GI and also has early signs of Kirby's. She takes Protonix twice daily. Pulm said she may have esophelic asthma. She also had a follow-up with her neurologist . She was started on nasal sprays twice daily and told to continue her oral antihistamine. She states she has no improvement in symptoms. She sometimes feels a PND daily. She occ has congestion 1-2x a week. She denies a runny nose. She denies any sneezing or itchy watery eyes. She does have hoarseness that comes and goes depending in how much she has been coughing for the last 6 months. She also did have thrush on her tongue a week ago and started using mouth rinse and helped. She is not using her inhalers since the last week due to the thrush. She states she can not tolerate nystain rinse because it makes her vomit.     24; RTC to see me (prior lydia) to follow up vocal cord nodules/leukoplakia. Did diflucan x 7 days last visit. Has not been coughing as much since last seen. Reflux still terrible. Now on nexium once a day, pepcid at night. Does ok if she does not eat. Sees [facility]. She takes carafate before eating when bad. She knows she should use more. Nose has been decent. Cut back on nasal sprays. Smoking 1 ppd. 1 gram marijuana.has done chantix, did not do well with mental health issues. Voice a little hoarse today, was better for a while.     Past Surgical History:   Procedure Laterality Date     SECTION      ESOPHAGOGASTRODUODENOSCOPY  2024    [facility]    TUBAL LIGATION        Past Medical History:   Diagnosis Date    Anxiety     Bipolar disorder     Hypertension     Migraine      Social Determinants of Health     Tobacco Use: High Risk (7/30/2024)    Patient History     Smoking Tobacco Use: Every Day     Smokeless Tobacco Use: Never     Passive Exposure: Not on file   Alcohol Use: Not At Risk (2/22/2024)    AUDIT-C     Frequency of Alcohol Consumption: Monthly or less     Average Number of Drinks: Patient does not drink     Frequency of Binge Drinking: Never   Financial Resource Strain: Medium Risk (2/22/2024)    Overall Financial Resource Strain (CARDIA)     Difficulty of Paying Living Expenses: Somewhat hard   Food Insecurity: No Food Insecurity (2/22/2024)    Hunger Vital Sign     Worried About Running Out of Food in the Last Year: Never true     Ran Out of Food in the Last Year: Never true   Transportation Needs: No Transportation Needs (2/22/2024)    PRAPARE - Transportation     Lack of Transportation (Medical): No     Lack of Transportation (Non-Medical): No   Physical Activity: Sufficiently Active (2/22/2024)    Exercise Vital Sign     Days of Exercise per Week: 4 days     Minutes of Exercise per Session: 60 min   Stress: Stress Concern Present (2/22/2024)    [employer] [place] [employer] - Occupational Stress Questionnaire     Feeling of Stress : Very much   Housing Stability: High Risk (2/22/2024)    Housing Stability Vital Sign     Unable to Pay for Housing in the Last Year: Yes     Number of Places Lived in the Last Year: 1     Unstable Housing in the Last Year: No   Depression: Low Risk  (2/15/2024)    Depression     Last PHQ-4: Flowsheet Data: 0   Utilities: Not on file   Health Literacy: Not on file   Social Isolation: Not on file     Review of patient's allergies indicates:   Allergen Reactions    Codeine Rash    Sulfamethoxazole-trimethoprim Rash    Montelukast Rash    Sulfa (sulfonamide antibiotics) Rash     Current Outpatient Medications   Medication  Instructions    albuterol (PROVENTIL/VENTOLIN HFA) 90 mcg/actuation inhaler 2 puffs, Every 4 hours PRN    amLODIPine (NORVASC) 10 mg, Oral, Daily    budesonide-formoterol 160-4.5 mcg (SYMBICORT) 160-4.5 mcg/actuation HFAA 2 puffs, Inhalation, Every 12 hours    CAPLYTA 21 mg Cap 1 capsule, Oral    diclofenac (VOLTAREN) 75 mg, Oral, 2 times daily    EMGALITY  mg, Subcutaneous, Every 28 days    esomeprazole (NEXIUM) 40 mg, Oral    famotidine (PEPCID) 40 mg, Oral, Daily    ibuprofen (ADVIL,MOTRIN) 600 mg, Oral, Every 6 hours PRN    NURTEC 75 mg, Oral, Daily PRN, Place ODT tablet on the tongue; alternatively the ODT tablet may be placed under the tongue    olmesartan (BENICAR) 20 mg, Oral    rizatriptan (MAXALT) 10 MG tablet 1 tab PO PRN migraine. May repeat every 2 hours for max 3 tabs in 24 hours. Use no more than 10 days per month.    rosuvastatin (CRESTOR) 10 mg, Oral    tiZANidine (ZANAFLEX) 4 MG tablet Half or full tablet by mouth at night as needed for muscle spasm    traMADoL (ULTRAM) 50 mg tablet Oral    traZODone (DESYREL) 50 mg, Oral, Nightly PRN    venlafaxine 225 mg TR24 1 tablet, Oral, Daily         ENT ROS negative except as stated above.     Patient answers are not available for this visit.            Objective:      There were no vitals filed for this visit.    General: NAD, well appearing  Eyes: Normal conjunctiva and lids  Face: symmetric, nerve intact  Nose: The nose is without any evidence of any deformity. The nasal mucosa is inflamed and dry. The septum is midline. There is no evidence of septal hematoma. The turbinates are enlarged.   Ears: The ears are with normal-appearing pinna. Examination of the canals is normal appearing bilaterally. There is no drainage or erythema noted. The tympanic membranes are normal appearing with pearly color, normal-appearing landmarks and normal light reflex. Hearing is grossly intact.  Mouth: No obvious abnormalities to the lips. The teeth are unremarkable.  The gingivae are without any obvious evidence of infection or lesion. The oral mucosa is moist and pink. There are no obvious masses to the hard or soft palate. Dentures in place.   Oropharynx: The uvula is midline.  The tongue is midline. The posterior pharynx is without erythema or exudate. The tonsils are normal appearing.  Salivary glands: The salivary glands are symmetric and not enlarged, no masses  Neck: No lymphadenopathy, trachea midline, thryoid not enlarged.  Psych: Normal mood and affect.   Neuro: Grossly intact  Speech: fluent    Procedure: Flexible laryngoscopy  Indications: follow up leukoplakia  Verbal consent obtained  Anesthesia: lidocaine and phenylephrine nasal spray  Procedure: Scope was passed into right or left nare, to nasopharynx and down to visualize the glottis. Findings below. Patient tolerated procedure well.     - Nose rhinitis changes, clear rhinorrhea, pale turbinates  - Nasopharynx- WNL, adenoids inflamed 10%, cobblestoning  - Oropharynx- BOT symmetric, no masses, lingual tonsils 1+  - Hypopharynx and piriform sinuses- no masses on trumpet maneuver  - Supraglottis- Arytenoids intact, no masses, not edematous or erythematous  - Glottis- Bilateral vocal folds intact with full motion, no masses, are red, white spots are gone.   - Glottic closure- complete                Assessment and Plan:       1. Tobacco dependence    2. Moderate persistent asthma without complication    3. Chronic cough    4. Nodules of vocal cords    5. Gastroesophageal reflux disease, unspecified whether esophagitis present    6. PND (post-nasal drip)    7. Dysphonia      -VC healed, white spot resolved. Cough better. Nose better. Reflux not better.       - Did  diflucan 100mg x7 days  - Continue nexium qam, Pepcid 40mg at night. She has carafate, doing PRN. Recommend TID before meals or after meals. At least at night. She will try.   - Continue Flonase and saline spray daily  - Continue follow-up with pulm for  cough  - Continue follow-up with GI  - May add saline to neb   - Try to cut back on tobacco, discussed keeping marijuana, working on tobacco. Using water pipe for MJ.   - Allergy testing negative    RTC: PRN    Plan of care was discussed in detail with the patient, who agreed with the plan as above. All questions were answered in detail. An hour was spent with the patient reviewing all medical history and current symptoms.     Heather Guerrero,   Otolaryngology

## 2024-09-10 ENCOUNTER — PATIENT OUTREACH (OUTPATIENT)
Dept: ADMINISTRATIVE | Facility: HOSPITAL | Age: 39
End: 2024-09-10
Payer: COMMERCIAL

## 2024-09-10 NOTE — LETTER
[id number]    AUTHORIZATION FOR RELEASE OF   CONFIDENTIAL INFORMATION    Dear Dr. Weeks,    We are seeing Kalee Duggan, date of birth 1985, in the clinic at [facility]. Jeni Bull MD is the patient's PCP. Kalee Duggan has an outstanding lab/procedure at the time we reviewed her chart. In order to help keep her health information updated, she has authorized us to request the following medical record(s):             ( x ) MOST RECENT PAP SMEAR & HPV           Please fax records to [facility], [phone]     If you have any questions, please contact    YASHIRA Be at [phone]          Patient Name: Kalee Duggan  : 1985  Patient Phone #: [phone]

## 2024-09-24 ENCOUNTER — OFFICE VISIT (OUTPATIENT)
Dept: NEUROLOGY | Facility: CLINIC | Age: 39
End: 2024-09-24
Payer: COMMERCIAL

## 2024-09-24 VITALS
BODY MASS INDEX: 34.27 KG/M2 | WEIGHT: 187.38 LBS | HEART RATE: 93 BPM | SYSTOLIC BLOOD PRESSURE: 129 MMHG | DIASTOLIC BLOOD PRESSURE: 86 MMHG | RESPIRATION RATE: 20 BRPM

## 2024-09-24 DIAGNOSIS — G24.9 DYSTONIA: ICD-10-CM

## 2024-09-24 DIAGNOSIS — G43.719 INTRACTABLE CHRONIC MIGRAINE WITHOUT AURA AND WITHOUT STATUS MIGRAINOSUS: Primary | ICD-10-CM

## 2024-09-24 PROBLEM — G44.40 MEDICATION OVERUSE HEADACHE: Status: RESOLVED | Noted: 2024-02-22 | Resolved: 2024-09-24

## 2024-09-24 PROCEDURE — 99999 PR PBB SHADOW E&M-EST. PATIENT-LVL V: CPT | Mod: PBBFAC,,, | Performed by: NURSE PRACTITIONER

## 2024-09-24 RX ORDER — KETOROLAC TROMETHAMINE 30 MG/ML
30 INJECTION, SOLUTION INTRAMUSCULAR; INTRAVENOUS
Status: COMPLETED | OUTPATIENT
Start: 2024-09-24 | End: 2024-09-24

## 2024-09-24 RX ORDER — CLONIDINE 0.1 MG/24H
1 PATCH, EXTENDED RELEASE TRANSDERMAL
COMMUNITY

## 2024-09-24 RX ADMIN — KETOROLAC TROMETHAMINE 30 MG: 30 INJECTION, SOLUTION INTRAMUSCULAR; INTRAVENOUS at 09:09

## 2024-09-24 NOTE — PROGRESS NOTES
Date of service: 9/24/2024  Referring provider: No ref. provider found    Subjective:      Chief complaint: Headache       Patient ID: Kalee Duggan is a 39 y.o. female with bipolar, chronic migraine, anxiety, GERD, asthma, HTN who presents for follow up of headache     History of Present Illness    INTERVAL HISTORY 9/24/24    Last visit was four months ago and at that time she was doing better on Emgality.    Today she reports she is about the same. Current pain 8 with range 3-10. She has a daily headache with escalation to migraine 10 days per month. She takes Nurtec and rizatriptan six days per week. Since last visit, she now ice pick headaches that occur about 3 times per week. Last seconds but can recur 4-5 times per day. She will be starting Botox for dystonia with PM upcoming. Otherwise information below is reviewed and verified with no changes made     INTERVAL HISTORY 5/22/24    Last visit was three months ago and at that time I ordered and MRI and PT and we started Emgality. Brain MRI showed sinus issues otherwise normal. She is now followed by ENT for sinus issues.    Today she reports she is better. Current pain 2 with range 1-10. She has two headache days per week. She takes Nurtec and rizatriptan 2-3 days per week. No side effects to Emgality. Otherwise information below is reviewed and verified with no changes made     ORIGINAL HEADACHE HISTORY - 2/22/24  Age at onset and course over time: mid 20's began with infrequent migraines. She had severe attacks once every 4-5 months. Severity and frequency has increased as she has gotten older.   Family history of migraines - mom  Last eye exam - a little over a year ago  Location: generally right temple  Quality:  [x] pressure [x] tight [x] throbbing [x] sharp [x] stabbing   Severity: current 3 with range 2-10  Duration: days  Frequency: daily  Headaches awaken at night?:  no  Worst time of day: all day  Associated with: [x] photophobia [x]  phonophobia []  osmophobia [] blurred vision  [] double vision [x] loss of appetite [x] nausea [x] vomiting [] dizziness [] vertigo  [] tinnitus [] irritability [] sinus pressure [x] problems with concentration   [x] neck tightness   Alleviated by:  [x] sleep [x] darkness [x] massage [] heat [x] ice [x] medication  Exacerbated by:  [x] fatigue [x] light [x] noise [] smells [x] coughing [] sneezing  [] bending over [] ovulation [] menses [] alcohol [x] change in weather [x]  stress  Ipsilateral autonomic: [] nasal congestion [] lacrimation [] ptosis [] injection [] edema [] foreign body sensation [] ear fullness   ICP:  [] transient visual obscurations  [] tinnitus   [] positional headache  [x] non-positional   Sleep habits: trouble staying asleep, unrefreshing sleep, has had sleep study which showed no sleep apnea  Caffeine intake: 10 (combination of espresso and coke zero)  Gyn status (if female): tubal ligation  HIT 6: 68    Current acute treatment:  Rizatriptan  Nurtec   Tylenol 3 - 3-4 days per week    Current prevention:  Venlafaxine  Trazodone  Trileptal  Amlodipine   Emgality - first February 2024    Previously tried/failed acute treatment:  Excedrin  Tylenol  Mobic  Nurtec  Ubrelvy  Sumatriptan - 3 days per week  Ibuprofen - daily  Tylenol - reduced to 2-3 days per week  Aleve - reduced to 2-3 days per week    Previously tried/failed preventative treatment:  Tegretol - not effective  Topamax - not effective   Atenolol  Nebivolol  Fluoxetine  Wellbutrin  Gabapentin   Propranolol - for headaches, not effective   Botox - one session by Dr. Pisano over one year ago  Aimovig   Emgality  Seroquel    Review of patient's allergies indicates:   Allergen Reactions    Codeine Rash    Sulfamethoxazole-trimethoprim Rash    Bactrim [sulfamethoxazole-trimethoprim]     Montelukast Rash    Sulfa (sulfonamide antibiotics) Rash     Current Outpatient Medications   Medication Sig Dispense Refill    albuterol (PROVENTIL/VENTOLIN HFA) 90  mcg/actuation inhaler Inhale 2 puffs into the lungs every 4 (four) hours as needed.      amLODIPine (NORVASC) 10 MG tablet Take 10 mg by mouth once daily.      budesonide-formoterol 160-4.5 mcg (SYMBICORT) 160-4.5 mcg/actuation HFAA Inhale 2 puffs into the lungs every 12 (twelve) hours.      CAPLYTA 21 mg Cap Take 1 capsule by mouth.      cloNIDine 0.1 mg/24 hr td ptwk (CATAPRES) 0.1 mg/24 hr Place 1 patch onto the skin every 7 days.      esomeprazole (NEXIUM) 40 MG capsule Take 40 mg by mouth.      famotidine (PEPCID) 40 MG tablet Take 1 tablet (40 mg total) by mouth every evening. 90 tablet 3    FLUoxetine 40 MG capsule Take 40 mg by mouth 2 (two) times daily.      galcanezumab-gnlm (EMGALITY PEN) 120 mg/mL PnIj Inject 1 mL (120 mg total) into the skin every 28 days. 1 mL 11    olmesartan (BENICAR) 20 MG tablet Take 20 mg by mouth.      PRILOSEC OTC 20 mg tablet Take 20 mg by mouth once daily.      rimegepant (NURTEC) 75 mg odt Take 1 tablet (75 mg total) by mouth daily as needed for Migraine. Place ODT tablet on the tongue; alternatively the ODT tablet may be placed under the tongue 16 tablet 11    rizatriptan (MAXALT) 10 MG tablet 1 tab PO PRN migraine. May repeat every 2 hours for max 3 tabs in 24 hours. Use no more than 10 days per month. 18 tablet 11    rosuvastatin (CRESTOR) 10 MG tablet Take 10 mg by mouth.      tiZANidine (ZANAFLEX) 4 MG tablet Half or full tablet by mouth at night as needed for muscle spasm 30 tablet 11    traZODone (DESYREL) 100 MG tablet Take 100 mg by mouth.      traZODone (DESYREL) 50 MG tablet Take 50 mg by mouth nightly as needed.      albuterol (PROVENTIL/VENTOLIN HFA) 90 mcg/actuation inhaler 2 puffs. (Patient not taking: Reported on 9/24/2024)      amLODIPine (NORVASC) 10 MG tablet Take 10 mg by mouth. (Patient not taking: Reported on 9/24/2024)      budesonide-formoterol 160-4.5 mcg (SYMBICORT) 160-4.5 mcg/actuation HFAA INHALE 2 PUFFS BY MOUTH TWICE DAILY AS DIRECTED (Patient  not taking: Reported on 2024)      nicotine (NICODERM CQ) 21 mg/24 hr 1 patch once daily. (Patient not taking: Reported on 2024)      rimegepant (NURTEC) 75 mg odt DISSOLVE 1 TABLET BY MOUTH ONCE DAILY AS NEEDED (Patient not taking: Reported on 2024)      traMADoL (ULTRAM) 50 mg tablet Take 1 to 2 tablets by mouth, every 6 hours as needed for pain relief. (Patient not taking: Reported on 2024) 21 tablet 0    venlafaxine 225 mg TR24 Take 1 tablet by mouth Daily.       No current facility-administered medications for this visit.       Past Medical History  Past Medical History:   Diagnosis Date    Anxiety     Bipolar disorder     Depression     Hypertension     Medication overuse headache 2024    Resolved. She was previously taking daily ibuprofen for at least past year. Discussed nature of diagnosis and need to stop all OTC to see any improvement in migraine frequency. Add prednisone course and compazine. Lifestyle habits discussed as she does drink high amounts of caffeine daily       Migraine     Panic attacks        Past Surgical History  Past Surgical History:   Procedure Laterality Date     SECTION      ESOPHAGOGASTRODUODENOSCOPY  2024    [facility]    TUBAL LIGATION         Family History  Family History   Problem Relation Name Age of Onset    Hypertension Mother      Heart disease Father      Depression Other         Social History  Social History     Socioeconomic History    Marital status:    Tobacco Use    Smoking status: Every Day     Current packs/day: 1.00     Types: Cigarettes    Smokeless tobacco: Never   Substance and Sexual Activity    Alcohol use: Not Currently     Comment: occ    Drug use: Yes     Types: Marijuana   Social History Narrative    ** Merged History Encounter **          Social Determinants of Health     Financial Resource Strain: Medium Risk (2024)    Overall Financial Resource Strain (CARDIA)     Difficulty of Paying Living  Expenses: Somewhat hard   Food Insecurity: No Food Insecurity (2/22/2024)    Hunger Vital Sign     Worried About Running Out of Food in the Last Year: Never true     Ran Out of Food in the Last Year: Never true   Transportation Needs: No Transportation Needs (2/22/2024)    PRAPARE - Transportation     Lack of Transportation (Medical): No     Lack of Transportation (Non-Medical): No   Physical Activity: Sufficiently Active (2/22/2024)    Exercise Vital Sign     Days of Exercise per Week: 4 days     Minutes of Exercise per Session: 60 min   Stress: Stress Concern Present (2/22/2024)    [employer] [place] [employer] - Occupational Stress Questionnaire     Feeling of Stress : Very much   Housing Stability: High Risk (2/22/2024)    Housing Stability Vital Sign     Unable to Pay for Housing in the Last Year: Yes     Number of Places Lived in the Last Year: 1     Unstable Housing in the Last Year: No        Objective:        Vitals:    09/24/24 0911   BP: 129/86   Pulse: 93   Resp: 20         Body mass index is 34.27 kg/m².    9/24/24  Constitutional:   She appears well-developed and well-nourished. She is well groomed     Neurological Exam:  General: well-developed, well-nourished, no distress  Mental status: Awake and alert  Speech language: No dysarthria or aphasia on conversation  Cranial nerves: Face symmetric  Motor: Moves all extremities well  Coordination: No ataxia. No tremor.    2/22/24  Constitutional: appears in no acute distress, well-developed, well-nourished     Eyes: normal conjunctiva, PERRLA    Ears, nose, mouth, throat: external appearance of ears and nose normal, hearing intact     Cardiovascular: regular rate and rhythm, no murmurs appreciated    Respiratory: unlabored respirations, breath sounds normal bilaterally    Gastrointestinal: no visible abdominal masses, no guarding, no visible hernia    Musculoskeletal: normal tone in all four extremities. No abnormal movements. No pronator drift.  No orbit. Symmetric finger tapping. Normal station. Normal regular gait. Normal tandem gait.      Spine:   CERVICAL SPINE:  ROM: normal   MUSCLE SPASM: in all planes    FACET LOADING: bilateral    SPURLING: no  LEONID / VINCE tender: no     Psychiatric: normal judgment and insight. Oriented to person, place, and time.     Neurologic:   Cortical functions: recent and remote memory intact, normal attention span and concentration, speech fluent, adequate fund of knowledge   Cranial nerves: visual fields full, PERRLA, EOMI, symmetric facial strength, hearing intact, palate elevates symmetrically, shoulder shrug 5/5, tongue protrudes midline   Reflexes: 2+ in the upper and lower extremities, no Chapa  Sensation: intact to temperature throughout   Coordination: normal finger to nose, heel to shin    Data Review:     I have personally reviewed the referring provider's notes, labs, & imaging made available to me today.      RADIOLOGY STUDIES:  I have personally reviewed the pertinent images performed.       No results found for this or any previous visit.    Lab Results   Component Value Date     02/16/2024    K 4.1 02/16/2024     02/16/2024    CO2 23 02/16/2024    BUN 7 02/16/2024    CREATININE 0.7 02/16/2024    GLU 93 02/16/2024    HGBA1C 5.2 02/16/2024    AST 15 02/16/2024    ALT 16 02/16/2024    ALBUMIN 3.9 02/16/2024    PROT 6.4 02/16/2024    BILITOT 0.3 02/16/2024    CHOL 232 (H) 02/16/2024    HDL 45 02/16/2024    LDLCALC 150.4 02/16/2024    TRIG 183 (H) 02/16/2024       Lab Results   Component Value Date    WBC 9.70 02/16/2024    HGB 14.9 02/16/2024    HCT 45.7 02/16/2024    MCV 94 02/16/2024     02/16/2024       Lab Results   Component Value Date    TSH 1.008 02/17/2012           Assessment & Plan:       Problem List Items Addressed This Visit          Neuro    Intractable chronic migraine without aura and without status migrainosus - Primary    Overview     Migraine headaches since her 20's.  Headaches are typically unilateral, moderate to severe in intensity, worsen with activity, pounding in quality and associated with sensitivity to light and sound.   We discussed options for prevention to include CGRP vs Botox. Will start with CGRP. Galcanezumab (Emgality) treatment was approved for the prevention of acute and chronic migraine on 9/27/2018. The patient will be an ideal candidate for Emgality. We are planning for 3 treatments 28 days apart. If we see no improvement after 3 treatments, we will discontinue the injections.   For acute attacks, would ideally start Nurtec.          Current Assessment & Plan     Having more migraines but seems to be triggered by chronic pain and muscular issues. Has Botox for dystonia approved. Will see if she improves. If not, will likely try to get Botox for chronic migraine approved instead.          Relevant Medications    ketorolac injection 30 mg (Completed)     Other Visit Diagnoses       Dystonia        Reports she is approved for Botox with pain management                    Please call our clinic at [phone] or send a message on the [facility] portal if there are any changes to the plan described below, for example,if you are not contacted for the requested tests, referral(s) within one week, if you are unable to receive the medications prescribed, or if you feel you need to change the treatment course for any reason.     TESTING:  -- none     REFERRALS:  -- none     PREVENTION (use daily regardless of headache):  -- continue Emgality injection once every 28 days   -- continue magnesium in ONE of the following preparations -               1. Magnesium oxide 800mg daily (the most common over the counter kind, may causes loose stools)              2. Magnesium citrate 400-500mg daily (harder to find, but more neutral on the bowels)              3. Magnesium glycinate 400mg daily (hardest to find, look online, but most bowel-neutral, best absorbed)     AS-NEEDED  TREATMENT (use total no more than 10 days per month unless otherwise stated):  -- continue rizatriptan with next migraine. You can repeat two hours later if needed  -- continue Nurtec with next migraine. This dissolves under the tongue and is only taken once in 24 hour time frame.  -- continue tizanidine at night. This is a muscle relaxer and it will also make you potentially sleepy. Start with half a tablet to see how you respond but can take up to a whole tablet if needed  -- continue compazine. This is a nausea medication that also has anti-migraine properties. Can take daily and will not contribute to rebound headaches    Follow up in about 3 months (around 12/24/2024).    Richelle Ordonez, NP

## 2024-09-24 NOTE — PATIENT INSTRUCTIONS
Please call our clinic at [phone] or send a message on the [facility] portal if there are any changes to the plan described below, for example,if you are not contacted for the requested tests, referral(s) within one week, if you are unable to receive the medications prescribed, or if you feel you need to change the treatment course for any reason.     TESTING:  -- none     REFERRALS:  -- none     PREVENTION (use daily regardless of headache):  -- continue Emgality injection once every 28 days   -- continue magnesium in ONE of the following preparations -               1. Magnesium oxide 800mg daily (the most common over the counter kind, may causes loose stools)              2. Magnesium citrate 400-500mg daily (harder to find, but more neutral on the bowels)              3. Magnesium glycinate 400mg daily (hardest to find, look online, but most bowel-neutral, best absorbed)     AS-NEEDED TREATMENT (use total no more than 10 days per month unless otherwise stated):  -- continue rizatriptan with next migraine. You can repeat two hours later if needed  -- continue Nurtec with next migraine. This dissolves under the tongue and is only taken once in 24 hour time frame.  -- continue tizanidine at night. This is a muscle relaxer and it will also make you potentially sleepy. Start with half a tablet to see how you respond but can take up to a whole tablet if needed  -- continue compazine. This is a nausea medication that also has anti-migraine properties. Can take daily and will not contribute to rebound headaches

## 2024-09-24 NOTE — ASSESSMENT & PLAN NOTE
Having more migraines but seems to be triggered by chronic pain and muscular issues. Has Botox for dystonia approved. Will see if she improves. If not, will likely try to get Botox for chronic migraine approved instead.

## 2024-10-10 ENCOUNTER — PATIENT OUTREACH (OUTPATIENT)
Dept: ADMINISTRATIVE | Facility: HOSPITAL | Age: 39
End: 2024-10-10
Payer: COMMERCIAL

## 2024-10-29 ENCOUNTER — OFFICE VISIT (OUTPATIENT)
Dept: PRIMARY CARE CLINIC | Facility: CLINIC | Age: 39
End: 2024-10-29
Payer: COMMERCIAL

## 2024-10-29 VITALS
OXYGEN SATURATION: 99 % | WEIGHT: 192.88 LBS | DIASTOLIC BLOOD PRESSURE: 88 MMHG | HEIGHT: 62 IN | BODY MASS INDEX: 35.49 KG/M2 | TEMPERATURE: 98 F | SYSTOLIC BLOOD PRESSURE: 154 MMHG | HEART RATE: 93 BPM

## 2024-10-29 DIAGNOSIS — F17.200 TOBACCO DEPENDENCE: ICD-10-CM

## 2024-10-29 DIAGNOSIS — B37.9 YEAST INFECTION: Primary | ICD-10-CM

## 2024-10-29 DIAGNOSIS — B37.0 THRUSH: ICD-10-CM

## 2024-10-29 DIAGNOSIS — J31.0 CHRONIC RHINITIS: ICD-10-CM

## 2024-10-29 PROCEDURE — 1160F RVW MEDS BY RX/DR IN RCRD: CPT | Mod: CPTII,S$GLB,, | Performed by: INTERNAL MEDICINE

## 2024-10-29 PROCEDURE — 4010F ACE/ARB THERAPY RXD/TAKEN: CPT | Mod: CPTII,S$GLB,, | Performed by: INTERNAL MEDICINE

## 2024-10-29 PROCEDURE — 99214 OFFICE O/P EST MOD 30 MIN: CPT | Mod: S$GLB,,, | Performed by: INTERNAL MEDICINE

## 2024-10-29 PROCEDURE — 3008F BODY MASS INDEX DOCD: CPT | Mod: CPTII,S$GLB,, | Performed by: INTERNAL MEDICINE

## 2024-10-29 PROCEDURE — 3079F DIAST BP 80-89 MM HG: CPT | Mod: CPTII,S$GLB,, | Performed by: INTERNAL MEDICINE

## 2024-10-29 PROCEDURE — 3044F HG A1C LEVEL LT 7.0%: CPT | Mod: CPTII,S$GLB,, | Performed by: INTERNAL MEDICINE

## 2024-10-29 PROCEDURE — 99999 PR PBB SHADOW E&M-EST. PATIENT-LVL V: CPT | Mod: PBBFAC,,, | Performed by: INTERNAL MEDICINE

## 2024-10-29 PROCEDURE — 3077F SYST BP >= 140 MM HG: CPT | Mod: CPTII,S$GLB,, | Performed by: INTERNAL MEDICINE

## 2024-10-29 PROCEDURE — 1159F MED LIST DOCD IN RCRD: CPT | Mod: CPTII,S$GLB,, | Performed by: INTERNAL MEDICINE

## 2024-10-29 RX ORDER — PROCHLORPERAZINE MALEATE 10 MG
10 TABLET ORAL ONCE
COMMUNITY
Start: 2024-10-15

## 2024-10-29 RX ORDER — FLUCONAZOLE 100 MG/1
100 TABLET ORAL DAILY
Qty: 7 TABLET | Refills: 0 | Status: SHIPPED | OUTPATIENT
Start: 2024-10-29 | End: 2024-11-05

## 2024-10-29 RX ORDER — OXCARBAZEPINE 300 MG/1
300 TABLET, FILM COATED ORAL 2 TIMES DAILY
COMMUNITY
Start: 2024-09-28

## 2024-10-29 RX ORDER — DUPILUMAB 300 MG/2ML
INJECTION, SOLUTION SUBCUTANEOUS
COMMUNITY
Start: 2024-09-19

## 2024-10-29 RX ORDER — VENLAFAXINE HYDROCHLORIDE 150 MG/1
150 CAPSULE, EXTENDED RELEASE ORAL DAILY
COMMUNITY
Start: 2024-09-21

## 2024-10-29 RX ORDER — ACETAMINOPHEN AND CODEINE PHOSPHATE 300; 30 MG/1; MG/1
1 TABLET ORAL EVERY 4 HOURS PRN
COMMUNITY
Start: 2024-09-21

## 2024-10-29 RX ORDER — AZELASTINE 1 MG/ML
1 SPRAY, METERED NASAL 2 TIMES DAILY
Qty: 30 ML | Refills: 11 | Status: SHIPPED | OUTPATIENT
Start: 2024-10-29 | End: 2025-10-29

## 2024-10-29 RX ORDER — FLUCONAZOLE 150 MG/1
150 TABLET ORAL DAILY
Qty: 1 TABLET | Refills: 0 | Status: SHIPPED | OUTPATIENT
Start: 2024-10-29 | End: 2024-10-29

## 2024-11-07 ENCOUNTER — PATIENT MESSAGE (OUTPATIENT)
Dept: PRIMARY CARE CLINIC | Facility: CLINIC | Age: 39
End: 2024-11-07
Payer: COMMERCIAL

## 2024-11-12 NOTE — PROGRESS NOTES
Assessment/Plan:    Problem List Items Addressed This Visit          Other    Tobacco dependence    Overview     -the patient was counseled on the dangers of tobacco use  -reviewed strategies to maximize success, including counseling, nicotine replacement therapy and pharmacologic option.   -tried nicotine replacement, chantix and wellbutrin without success  -she is now open to trying tobacco cessation class; referral placed    Time spent: 3-10 minutes         Relevant Orders    Ambulatory referral/consult to Smoking Cessation Program     Other Visit Diagnoses       Yeast infection    -  Primary    Thrush        Chronic rhinitis        Relevant Medications    azelastine (ASTELIN) 137 mcg (0.1 %) nasal spray            Follow up if symptoms worsen or fail to improve.    Jeni Bull MD  _____________________________________________________________________________________________________________________________________________________    CC: sinus complaint    Patient is in clinic today as an established patient.    History of Present Illness  The patient is a 39-year-old female presenting with sinus complaints.    She has been experiencing sinus issues for an extended period. Her symptoms began after her daughter contracted a sinus infection. Despite completing a week-long course of doxycycline prescribed at an urgent care visit, she reports no improvement in her symptoms. She continues to experience severe postnasal drip, which is causing a cough. Her congestion has lessened, but she still experiences mild wheezing. She is currently using Symbicort and albuterol. She has been using Flonase and Xyzal for approximately a year. She has not tried Astelin nasal spray. Allergy tests conducted by her ENT specialist were negative. She is seeking another antibiotic as her next pulmonology appointment is not for another month and a half.    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     She developed oral thrush and a yeast infection due to the antibiotic, which she is currently experiencing symptoms from.    She has not tried smoking cessation patches, as previous attempts to quit smoking have led to severe depression. She has previously seen a psychiatrist and was prescribed Wellbutrin. She has also tried Chantix but did not tolerate it well.    She experienced ear discomfort a few weeks ago, but this has since improved.    SOCIAL HISTORY  She is still smoking.    ALLERGIES  She is allergic to SINGULAIR.     No other new complaints today.      Current Outpatient Medications on File Prior to Visit   Medication Sig Dispense Refill    acetaminophen-codeine 300-30mg (TYLENOL #3) 300-30 mg Tab Take 1 tablet by mouth every 4 (four) hours as needed.      albuterol (PROVENTIL/VENTOLIN HFA) 90 mcg/actuation inhaler Inhale 2 puffs into the lungs every 4 (four) hours as needed.      amLODIPine (NORVASC) 10 MG tablet Take 10 mg by mouth once daily.      budesonide-formoterol 160-4.5 mcg (SYMBICORT) 160-4.5 mcg/actuation HFAA Inhale 2 puffs into the lungs every 12 (twelve) hours.      CAPLYTA 21 mg Cap Take 1 capsule by mouth.      cloNIDine 0.1 mg/24 hr td ptwk (CATAPRES) 0.1 mg/24 hr Place 1 patch onto the skin every 7 days.      DUPIXENT  mg/2 mL PnIj       esomeprazole (NEXIUM) 40 MG capsule Take 40 mg by mouth.      famotidine (PEPCID) 40 MG tablet Take 1 tablet (40 mg total) by mouth every evening. 90 tablet 3    FLUoxetine 40 MG capsule Take 40 mg by mouth 2 (two) times daily.      galcanezumab-gnlm (EMGALITY PEN) 120 mg/mL PnIj Inject 1 mL (120 mg total) into the skin every 28 days. 1 mL 11    olmesartan (BENICAR) 20 MG tablet Take 20 mg by mouth.      OXcarbazepine (TRILEPTAL) 300 MG Tab Take 300 mg by mouth 2 (two) times daily.      prochlorperazine (COMPAZINE) 10 MG tablet Take 10 mg by mouth once.      rimegepant (NURTEC) 75 mg odt  "Take 1 tablet (75 mg total) by mouth daily as needed for Migraine. Place ODT tablet on the tongue; alternatively the ODT tablet may be placed under the tongue 16 tablet 11    rizatriptan (MAXALT) 10 MG tablet 1 tab PO PRN migraine. May repeat every 2 hours for max 3 tabs in 24 hours. Use no more than 10 days per month. 18 tablet 11    rosuvastatin (CRESTOR) 10 MG tablet Take 10 mg by mouth.      tiZANidine (ZANAFLEX) 4 MG tablet Half or full tablet by mouth at night as needed for muscle spasm 30 tablet 11    traZODone (DESYREL) 100 MG tablet Take 100 mg by mouth.      traZODone (DESYREL) 50 MG tablet Take 50 mg by mouth nightly as needed.      venlafaxine (EFFEXOR-XR) 150 MG Cp24 Take 150 mg by mouth once daily.       No current facility-administered medications on file prior to visit.       Review of Systems   Constitutional:  Negative for chills, diaphoresis, fatigue and fever.   HENT:  Positive for postnasal drip and sore throat. Negative for congestion, ear pain, rhinorrhea and sinus pain.    Eyes:  Negative for pain and redness.   Respiratory:  Positive for cough and wheezing. Negative for chest tightness and shortness of breath.    Cardiovascular:  Negative for chest pain and leg swelling.   Gastrointestinal:  Negative for abdominal pain, constipation, diarrhea, nausea and vomiting.   Genitourinary:  Negative for dysuria and hematuria.   Musculoskeletal:  Positive for myalgias. Negative for arthralgias and joint swelling.   Skin:  Negative for rash.   Neurological:  Positive for headaches. Negative for dizziness and syncope.   Psychiatric/Behavioral:  Negative for dysphoric mood. The patient is not nervous/anxious.        Vitals:    10/29/24 1006   BP: (!) 154/88   BP Location: Left arm   Patient Position: Sitting   Pulse: 93   Temp: 98.4 °F (36.9 °C)   SpO2: 99%   Weight: 87.5 kg (192 lb 14.4 oz)   Height: 5' 2" (1.575 m)       Wt Readings from Last 3 Encounters:   10/29/24 87.5 kg (192 lb 14.4 oz)   09/24/24 " 85 kg (187 lb 6.3 oz)   07/30/24 85 kg (187 lb 6.3 oz)       Physical Exam  Constitutional:       General: She is not in acute distress.     Appearance: Normal appearance. She is well-developed.   HENT:      Head: Normocephalic and atraumatic.      Right Ear: A middle ear effusion is present. Tympanic membrane is not erythematous, retracted or bulging.      Left Ear: A middle ear effusion is present. Tympanic membrane is not erythematous, retracted or bulging.      Mouth/Throat:      Pharynx: No oropharyngeal exudate or posterior oropharyngeal erythema.   Eyes:      Conjunctiva/sclera: Conjunctivae normal.   Cardiovascular:      Rate and Rhythm: Normal rate and regular rhythm.      Pulses: Normal pulses.      Heart sounds: Normal heart sounds. No murmur heard.  Pulmonary:      Effort: Pulmonary effort is normal. No respiratory distress.      Breath sounds: Normal breath sounds. No wheezing, rhonchi or rales.   Abdominal:      General: Bowel sounds are normal. There is no distension.      Palpations: Abdomen is soft.      Tenderness: There is no abdominal tenderness.   Musculoskeletal:         General: Normal range of motion.      Cervical back: Normal range of motion and neck supple.   Skin:     General: Skin is warm and dry.      Findings: No rash.   Neurological:      General: No focal deficit present.      Mental Status: She is alert and oriented to person, place, and time.   Psychiatric:         Mood and Affect: Mood normal.         Behavior: Behavior normal.         DISCLAIMER: This note was compiled by using a speech recognition dictation system and therefore please be aware that typographical / speech recognition errors can and do occur.  Please contact me if you see any errors specifically.  Consent was obtained for TAWANDA recording system prior to the visit.  Answers submitted by the patient for this visit:  Cough Questionnaire (Submitted on 10/26/2024)  Chief Complaint: Cough  Chronicity: chronic  Onset: more  than 1 month ago  Progression since onset: waxing and waning  Frequency: every few hours  Cough characteristics: productive of sputum  ear congestion: Yes  heartburn: Yes  hemoptysis: No  nasal congestion: Yes  sweats: No  weight loss: No  Aggravated by: dust, exercise, fumes, lying down, stress  Risk factors for lung disease: animal exposure, smoking/tobacco exposure  asthma: Yes  bronchitis: Yes  COPD: Yes  pneumonia: Yes  Treatments tried: OTC cough suppressant, a beta-agonist inhaler, body position changes, cool air, oral steroids, prescription cough suppressant, rest, steroid inhaler  Improvement on treatment: no relief

## 2024-11-18 ENCOUNTER — HOSPITAL ENCOUNTER (OUTPATIENT)
Dept: RADIOLOGY | Facility: HOSPITAL | Age: 39
Discharge: HOME | End: 2024-11-18
Attending: INTERNAL MEDICINE
Payer: COMMERCIAL

## 2024-11-18 ENCOUNTER — OFFICE VISIT (OUTPATIENT)
Dept: FAMILY MEDICINE | Facility: CLINIC | Age: 39
End: 2024-11-18
Payer: COMMERCIAL

## 2024-11-18 VITALS
WEIGHT: 191 LBS | TEMPERATURE: 98 F | HEIGHT: 62 IN | DIASTOLIC BLOOD PRESSURE: 76 MMHG | BODY MASS INDEX: 35.15 KG/M2 | HEART RATE: 68 BPM | SYSTOLIC BLOOD PRESSURE: 132 MMHG

## 2024-11-18 DIAGNOSIS — R07.81 RIB PAIN: Primary | ICD-10-CM

## 2024-11-18 DIAGNOSIS — S22.43XG MULTIPLE CLOSED FRACTURES OF RIBS OF BOTH SIDES WITH DELAYED HEALING, SUBSEQUENT ENCOUNTER: ICD-10-CM

## 2024-11-18 DIAGNOSIS — Z23 NEED FOR VACCINATION: ICD-10-CM

## 2024-11-18 DIAGNOSIS — Z78.0 ASYMPTOMATIC AGE-RELATED POSTMENOPAUSAL STATE: ICD-10-CM

## 2024-11-18 DIAGNOSIS — R07.81 RIB PAIN: ICD-10-CM

## 2024-11-18 DIAGNOSIS — Z23 INFLUENZA VACCINE NEEDED: ICD-10-CM

## 2024-11-18 PROCEDURE — 3078F DIAST BP <80 MM HG: CPT | Mod: CPTII,S$GLB,, | Performed by: INTERNAL MEDICINE

## 2024-11-18 PROCEDURE — 1159F MED LIST DOCD IN RCRD: CPT | Mod: CPTII,S$GLB,, | Performed by: INTERNAL MEDICINE

## 2024-11-18 PROCEDURE — 3008F BODY MASS INDEX DOCD: CPT | Mod: CPTII,S$GLB,, | Performed by: INTERNAL MEDICINE

## 2024-11-18 PROCEDURE — 99214 OFFICE O/P EST MOD 30 MIN: CPT | Mod: 25,S$GLB,, | Performed by: INTERNAL MEDICINE

## 2024-11-18 PROCEDURE — 90656 IIV3 VACC NO PRSV 0.5 ML IM: CPT | Mod: S$GLB,,, | Performed by: INTERNAL MEDICINE

## 2024-11-18 PROCEDURE — 99999 PR PBB SHADOW E&M-EST. PATIENT-LVL V: CPT | Mod: PBBFAC,,, | Performed by: INTERNAL MEDICINE

## 2024-11-18 PROCEDURE — 3044F HG A1C LEVEL LT 7.0%: CPT | Mod: CPTII,S$GLB,, | Performed by: INTERNAL MEDICINE

## 2024-11-18 PROCEDURE — 90471 IMMUNIZATION ADMIN: CPT | Mod: S$GLB,,, | Performed by: INTERNAL MEDICINE

## 2024-11-18 PROCEDURE — 71110 X-RAY EXAM RIBS BIL 3 VIEWS: CPT | Mod: 26,,, | Performed by: RADIOLOGY

## 2024-11-18 PROCEDURE — 71110 X-RAY EXAM RIBS BIL 3 VIEWS: CPT | Mod: TC,PO

## 2024-11-18 PROCEDURE — 4010F ACE/ARB THERAPY RXD/TAKEN: CPT | Mod: CPTII,S$GLB,, | Performed by: INTERNAL MEDICINE

## 2024-11-18 PROCEDURE — 3075F SYST BP GE 130 - 139MM HG: CPT | Mod: CPTII,S$GLB,, | Performed by: INTERNAL MEDICINE

## 2024-11-18 RX ORDER — DICLOFENAC SODIUM 50 MG/1
50 TABLET, DELAYED RELEASE ORAL 4 TIMES DAILY PRN
COMMUNITY
Start: 2024-11-03

## 2024-11-25 ENCOUNTER — PATIENT MESSAGE (OUTPATIENT)
Dept: FAMILY MEDICINE | Facility: CLINIC | Age: 39
End: 2024-11-25
Payer: COMMERCIAL

## 2024-11-26 NOTE — PROGRESS NOTES
Assessment/Plan:    Problem List Items Addressed This Visit    None  Visit Diagnoses       Rib pain    -  Primary  -chronic bilateral rib pain, posterior > anterior  -also reports recent xray showing additional healing fractures  -will repeat xray  -also placing referral to PMR    Relevant Orders    X-Ray Ribs 3 Views Bilateral (Completed)    Ambulatory referral/consult to Physical Medicine Rehab    Influenza vaccine needed        Need for vaccination        Relevant Orders    Influenza - Trivalent - PF (ADULT) (Completed)            Follow up if symptoms worsen or fail to improve.    Jeni Bull MD  _____________________________________________________________________________________________________________________________________________________    CC: rib pain    Patient is in clinic today as an established patient.    History of Present Illness  The patient is a 39-year-old female here for rib pain. She is accompanied by her mother.    She has been experiencing rib pain for over a year, with at least three confirmed fractures that were not initially detected. Recent x-rays from [facility] revealed two additional healing fractures on the left side, specifically the sixth and seventh ribs, along with cavus formation on her left rib cage. She reports no falls or trauma to the rib cag. 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 Despite seeing her pain management specialist, she has not received any treatment for her rib condition. She reports that her pain specialist instructed her to follow up with her PCP for a diagnosis and he could refer her to someone for treatment.     She also has scoliosis, which she believes may be contributing to her rib pain. The pain is bilateral and varies depending on her movements. She experiences a popping sensation when she twists her trunk or coughs, and this has been felt by both her  and  mother. She has tried using BenGay for relief, but it only provides minimal help. She has not used Voltaren gel due to concerns about its use in large quantities. Severe pain limits her breathing, and the pain seems to originate in the front and sometimes radiates to the back, with the back being more affected.     No other new complaints today.     Current Outpatient Medications on File Prior to Visit   Medication Sig Dispense Refill    acetaminophen-codeine 300-30mg (TYLENOL #3) 300-30 mg Tab Take 1 tablet by mouth every 4 (four) hours as needed.      albuterol (PROVENTIL/VENTOLIN HFA) 90 mcg/actuation inhaler Inhale 2 puffs into the lungs every 4 (four) hours as needed.      amLODIPine (NORVASC) 10 MG tablet Take 10 mg by mouth once daily.      azelastine (ASTELIN) 137 mcg (0.1 %) nasal spray 1 spray (137 mcg total) by Nasal route 2 (two) times daily. 30 mL 11    budesonide-formoterol 160-4.5 mcg (SYMBICORT) 160-4.5 mcg/actuation HFAA Inhale 2 puffs into the lungs every 12 (twelve) hours.      CAPLYTA 21 mg Cap Take 1 capsule by mouth.      cloNIDine 0.1 mg/24 hr td ptwk (CATAPRES) 0.1 mg/24 hr Place 1 patch onto the skin every 7 days.      diclofenac (VOLTAREN) 50 MG EC tablet Take 50 mg by mouth 4 (four) times daily as needed.      DUPIXENT  mg/2 mL PnIj       esomeprazole (NEXIUM) 40 MG capsule Take 40 mg by mouth.      famotidine (PEPCID) 40 MG tablet Take 1 tablet (40 mg total) by mouth every evening. 90 tablet 3    FLUoxetine 40 MG capsule Take 40 mg by mouth 2 (two) times daily.      galcanezumab-gnlm (EMGALITY PEN) 120 mg/mL PnIj Inject 1 mL (120 mg total) into the skin every 28 days. 1 mL 11    olmesartan (BENICAR) 20 MG tablet Take 20 mg by mouth.      OXcarbazepine (TRILEPTAL) 300 MG Tab Take 300 mg by mouth 2 (two) times daily.      prochlorperazine (COMPAZINE) 10 MG tablet Take 10 mg by mouth once.      rimegepant (NURTEC) 75 mg odt Take 1 tablet (75 mg total) by mouth daily as needed for  "Migraine. Place ODT tablet on the tongue; alternatively the ODT tablet may be placed under the tongue 16 tablet 11    rizatriptan (MAXALT) 10 MG tablet 1 tab PO PRN migraine. May repeat every 2 hours for max 3 tabs in 24 hours. Use no more than 10 days per month. 18 tablet 11    rosuvastatin (CRESTOR) 10 MG tablet Take 10 mg by mouth.      tiZANidine (ZANAFLEX) 4 MG tablet Half or full tablet by mouth at night as needed for muscle spasm 30 tablet 11    traZODone (DESYREL) 100 MG tablet Take 100 mg by mouth.       No current facility-administered medications on file prior to visit.       Review of Systems   Constitutional:  Negative for chills, diaphoresis, fatigue and fever.   HENT:  Negative for congestion, ear pain, postnasal drip, sinus pain and sore throat.    Eyes:  Negative for pain and redness.   Respiratory:  Negative for cough, chest tightness and shortness of breath.    Cardiovascular:  Negative for chest pain and leg swelling.   Gastrointestinal:  Negative for abdominal pain, constipation, diarrhea, nausea and vomiting.   Genitourinary:  Negative for dysuria and hematuria.   Musculoskeletal:  Negative for arthralgias and joint swelling.        Bilateral rib pain   Skin:  Negative for rash.   Neurological:  Negative for dizziness, syncope and headaches.   Psychiatric/Behavioral:  Negative for dysphoric mood. The patient is not nervous/anxious.      Vitals:    11/18/24 0927   BP: 132/76   BP Location: Right arm   Patient Position: Sitting   Pulse: 68   Temp: 98.4 °F (36.9 °C)   Weight: 86.6 kg (191 lb)   Height: 5' 2" (1.575 m)       Wt Readings from Last 3 Encounters:   11/18/24 86.6 kg (191 lb)   10/29/24 87.5 kg (192 lb 14.4 oz)   09/24/24 85 kg (187 lb 6.3 oz)       Physical Exam  Constitutional:       General: She is not in acute distress.     Appearance: Normal appearance. She is well-developed.   HENT:      Head: Normocephalic and atraumatic.   Eyes:      Conjunctiva/sclera: Conjunctivae normal. "   Cardiovascular:      Rate and Rhythm: Normal rate and regular rhythm.      Pulses: Normal pulses.      Heart sounds: Normal heart sounds. No murmur heard.  Pulmonary:      Effort: Pulmonary effort is normal. No respiratory distress.      Breath sounds: Normal breath sounds.   Abdominal:      General: Bowel sounds are normal. There is no distension.      Palpations: Abdomen is soft.      Tenderness: There is no abdominal tenderness.   Musculoskeletal:         General: Tenderness present. No swelling, deformity or signs of injury. Normal range of motion.      Cervical back: Normal range of motion and neck supple.   Skin:     General: Skin is warm and dry.      Findings: No rash.   Neurological:      General: No focal deficit present.      Mental Status: She is alert and oriented to person, place, and time.   Psychiatric:         Mood and Affect: Mood normal.         Behavior: Behavior normal.     DISCLAIMER: This note was compiled by using a speech recognition dictation system and therefore please be aware that typographical / speech recognition errors can and do occur.  Please contact me if you see any errors specifically.  Consent was obtained for TAWANDA recording system prior to the visit.

## 2024-11-27 ENCOUNTER — HOSPITAL ENCOUNTER (OUTPATIENT)
Dept: RADIOLOGY | Facility: HOSPITAL | Age: 39
Discharge: HOME | End: 2024-11-27
Attending: INTERNAL MEDICINE
Payer: COMMERCIAL

## 2024-11-27 DIAGNOSIS — S22.43XG MULTIPLE CLOSED FRACTURES OF RIBS OF BOTH SIDES WITH DELAYED HEALING, SUBSEQUENT ENCOUNTER: ICD-10-CM

## 2024-11-27 DIAGNOSIS — Z78.0 ASYMPTOMATIC AGE-RELATED POSTMENOPAUSAL STATE: ICD-10-CM

## 2024-11-27 PROCEDURE — 77080 DXA BONE DENSITY AXIAL: CPT | Mod: 26,,, | Performed by: RADIOLOGY

## 2024-11-27 PROCEDURE — 77080 DXA BONE DENSITY AXIAL: CPT | Mod: TC,PO

## 2024-11-27 NOTE — PROGRESS NOTES
Normal DEXA. Results have been released to patient's Mychart. Please verify that these have been viewed. If not, please call with interpretation below:    I have reviewed your bone density test.  The findings show that you have a normal bone density.  There is no evidence of osteopenia or osteoporosis of the bones.

## 2024-12-18 ENCOUNTER — PATIENT MESSAGE (OUTPATIENT)
Dept: FAMILY MEDICINE | Facility: CLINIC | Age: 39
End: 2024-12-18
Payer: COMMERCIAL

## 2024-12-25 ENCOUNTER — PATIENT MESSAGE (OUTPATIENT)
Dept: FAMILY MEDICINE | Facility: CLINIC | Age: 39
End: 2024-12-25
Payer: COMMERCIAL

## 2025-01-08 RX ORDER — PROCHLORPERAZINE MALEATE 10 MG
TABLET ORAL
Qty: 60 TABLET | Refills: 3 | Status: SHIPPED | OUTPATIENT
Start: 2025-01-08

## 2025-01-15 ENCOUNTER — TELEPHONE (OUTPATIENT)
Dept: FAMILY MEDICINE | Facility: CLINIC | Age: 40
End: 2025-01-15
Payer: COMMERCIAL

## 2025-01-15 ENCOUNTER — TELEPHONE (OUTPATIENT)
Dept: PRIMARY CARE CLINIC | Facility: CLINIC | Age: 40
End: 2025-01-15
Payer: COMMERCIAL

## 2025-01-15 DIAGNOSIS — S22.42XK CLOSED FRACTURE OF MULTIPLE RIBS OF LEFT SIDE WITH NONUNION, SUBSEQUENT ENCOUNTER: ICD-10-CM

## 2025-01-15 DIAGNOSIS — R07.89 CHEST WALL PAIN: Primary | ICD-10-CM

## 2025-01-15 NOTE — TELEPHONE ENCOUNTER
Received CT from [facility]. 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 Referral to CT surgery placed to see if they believe pain is related to these fractures and whether surgical intervention is needed.    I have signed for the following orders AND/OR meds.  Please call the patient and ask the patient to schedule the testing AND/OR inform about any medications that were sent. Medications have been sent to pharmacy listed below      Orders Placed This Encounter   Procedures    Ambulatory referral/consult to Cardiothoracic Surgery     Standing Status:   Future     Standing Expiration Date:   2/15/2026     Referral Priority:   Routine     Referral Type:   Consultation     Referral Reason:   Specialty Services Required     Requested Specialty:   Cardiothoracic Surgery     Number of Visits Requested:   1              [id number] - [place] - [address] AT [facility] OF [facility] [address]  [address] [facility] [address]  Phone: [phone] Fax: [phone]

## 2025-01-15 NOTE — TELEPHONE ENCOUNTER
Spoke with pt regarding recommendations. Could you please schedule referral placed for Cardiothoracic surgery? Thank you.

## 2025-01-28 ENCOUNTER — PATIENT MESSAGE (OUTPATIENT)
Dept: CARDIOLOGY | Facility: CLINIC | Age: 40
End: 2025-01-28
Payer: COMMERCIAL

## 2025-01-31 ENCOUNTER — TELEPHONE (OUTPATIENT)
Dept: NEUROLOGY | Facility: CLINIC | Age: 40
End: 2025-01-31

## 2025-01-31 ENCOUNTER — OFFICE VISIT (OUTPATIENT)
Dept: NEUROLOGY | Facility: CLINIC | Age: 40
End: 2025-01-31
Payer: COMMERCIAL

## 2025-01-31 VITALS
HEIGHT: 62 IN | WEIGHT: 191.81 LBS | DIASTOLIC BLOOD PRESSURE: 80 MMHG | TEMPERATURE: 98 F | BODY MASS INDEX: 35.3 KG/M2 | RESPIRATION RATE: 17 BRPM | SYSTOLIC BLOOD PRESSURE: 122 MMHG | HEART RATE: 78 BPM

## 2025-01-31 DIAGNOSIS — G43.719 INTRACTABLE CHRONIC MIGRAINE WITHOUT AURA AND WITHOUT STATUS MIGRAINOSUS: Primary | ICD-10-CM

## 2025-01-31 DIAGNOSIS — G24.9 DYSTONIA: ICD-10-CM

## 2025-01-31 DIAGNOSIS — E66.01 SEVERE OBESITY (BMI 35.0-39.9) WITH COMORBIDITY: ICD-10-CM

## 2025-01-31 PROCEDURE — 3079F DIAST BP 80-89 MM HG: CPT | Mod: CPTII,S$GLB,, | Performed by: NURSE PRACTITIONER

## 2025-01-31 PROCEDURE — 99214 OFFICE O/P EST MOD 30 MIN: CPT | Mod: S$GLB,,, | Performed by: NURSE PRACTITIONER

## 2025-01-31 PROCEDURE — 4010F ACE/ARB THERAPY RXD/TAKEN: CPT | Mod: CPTII,S$GLB,, | Performed by: NURSE PRACTITIONER

## 2025-01-31 PROCEDURE — 1159F MED LIST DOCD IN RCRD: CPT | Mod: CPTII,S$GLB,, | Performed by: NURSE PRACTITIONER

## 2025-01-31 PROCEDURE — 3008F BODY MASS INDEX DOCD: CPT | Mod: CPTII,S$GLB,, | Performed by: NURSE PRACTITIONER

## 2025-01-31 PROCEDURE — 3074F SYST BP LT 130 MM HG: CPT | Mod: CPTII,S$GLB,, | Performed by: NURSE PRACTITIONER

## 2025-01-31 PROCEDURE — 1160F RVW MEDS BY RX/DR IN RCRD: CPT | Mod: CPTII,S$GLB,, | Performed by: NURSE PRACTITIONER

## 2025-01-31 PROCEDURE — 99999 PR PBB SHADOW E&M-EST. PATIENT-LVL V: CPT | Mod: PBBFAC,,, | Performed by: NURSE PRACTITIONER

## 2025-01-31 RX ORDER — ATOGEPANT 60 MG/1
60 TABLET ORAL DAILY
Qty: 30 TABLET | Refills: 11 | Status: SHIPPED | OUTPATIENT
Start: 2025-01-31

## 2025-01-31 NOTE — TELEPHONE ENCOUNTER
The prior authorization for Kalee Duggan's Qulipta prescription has been APPROVED FROM 1/31/25 TO 1/31/26 with copayment of $0.00.

## 2025-01-31 NOTE — PROGRESS NOTES
Date of service: 1/31/2025  Referring provider: No ref. provider found    Subjective:      Chief complaint: Headache       Patient ID: Kalee Duggan is a 39 y.o. female with bipolar, chronic migraine, anxiety, GERD, asthma, HTN who presents for follow up of headache     History of Present Illness    INTERVAL HISTORY 1/31/25    Last visit was four months ago and at that time she was having 10 migraines per month and planning to start Botox for cervical dystonia. She has not started but she reports her outside provider (Dr. Guzman) now has her Botox and will start within the next 3 weeks. She has also started dry needling with PT, once.    Today she reports she is the same. Current pain 6 with range 4-10. Headache is almost daily. She takes Nurtec and rizatriptan 3-4 days per week. Otherwise information below is reviewed and verified with no changes made     INTERVAL HISTORY 9/24/24    Last visit was four months ago and at that time she was doing better on Emgality.    Today she reports she is about the same. Current pain 8 with range 3-10. She has a daily headache with escalation to migraine 10 days per month. She takes Nurtec and rizatriptan six days per week. Since last visit, she now ice pick headaches that occur about 3 times per week. Last seconds but can recur 4-5 times per day. She will be starting Botox for dystonia with PM upcoming. Otherwise information below is reviewed and verified with no changes made     INTERVAL HISTORY 5/22/24    Last visit was three months ago and at that time I ordered and MRI and PT and we started Emgality. Brain MRI showed sinus issues otherwise normal. She is now followed by ENT for sinus issues.    Today she reports she is better. Current pain 2 with range 1-10. She has two headache days per week. She takes Nurtec and rizatriptan 2-3 days per week. No side effects to Emgality. Otherwise information below is reviewed and verified with no changes made     ORIGINAL HEADACHE HISTORY -  2/22/24  Age at onset and course over time: mid 20's began with infrequent migraines. She had severe attacks once every 4-5 months. Severity and frequency has increased as she has gotten older.   Family history of migraines - mom  Last eye exam - a little over a year ago  Location: generally right temple  Quality:  [x] pressure [x] tight [x] throbbing [x] sharp [x] stabbing   Severity: current 3 with range 2-10  Duration: days  Frequency: daily  Headaches awaken at night?:  no  Worst time of day: all day  Associated with: [x] photophobia [x]  phonophobia [] osmophobia [] blurred vision  [] double vision [x] loss of appetite [x] nausea [x] vomiting [] dizziness [] vertigo  [] tinnitus [] irritability [] sinus pressure [x] problems with concentration   [x] neck tightness   Alleviated by:  [x] sleep [x] darkness [x] massage [] heat [x] ice [x] medication  Exacerbated by:  [x] fatigue [x] light [x] noise [] smells [x] coughing [] sneezing  [] bending over [] ovulation [] menses [] alcohol [x] change in weather [x]  stress  Ipsilateral autonomic: [] nasal congestion [] lacrimation [] ptosis [] injection [] edema [] foreign body sensation [] ear fullness   ICP:  [] transient visual obscurations  [] tinnitus   [] positional headache  [x] non-positional   Sleep habits: trouble staying asleep, unrefreshing sleep, has had sleep study which showed no sleep apnea  Caffeine intake: 10 (combination of espresso and coke zero)  Gyn status (if female): tubal ligation  HIT 6: 70    Current acute treatment:  Rizatriptan  Nurtec   Tylenol 3 - 3-4 days per week    Current prevention:  Venlafaxine  Trazodone  Trileptal  Amlodipine   Emgality - first February 2024    Previously tried/failed acute treatment:  Excedrin  Tylenol  Mobic  Nurtec  Ubrelvy  Sumatriptan - 3 days per week  Ibuprofen - daily  Tylenol - reduced to 2-3 days per week  Aleve - reduced to 2-3 days per week    Previously tried/failed preventative treatment:  Tegretol -  not effective  Topamax - not effective   Atenolol  Nebivolol  Fluoxetine  Wellbutrin  Gabapentin   Propranolol - for headaches, not effective   Botox - one session by Dr. Pisano over one year ago  Aimovig   Emgality  Seroquel    Review of patient's allergies indicates:   Allergen Reactions    Codeine Rash    Sulfamethoxazole-trimethoprim Rash    Montelukast Rash     Other Reaction(s): Other (See Comments)    Caused blisters on my hand per patient    Sulfa (sulfonamide antibiotics) Rash and Hives    Sulfamethoxazole-trimethoprim Hives and Rash     Other Reaction(s): Other (See Comments)     Current Outpatient Medications   Medication Sig Dispense Refill    acetaminophen-codeine 300-30mg (TYLENOL #3) 300-30 mg Tab Take 1 tablet by mouth every 4 (four) hours as needed.      albuterol (PROVENTIL/VENTOLIN HFA) 90 mcg/actuation inhaler Inhale 2 puffs into the lungs every 4 (four) hours as needed.      amLODIPine (NORVASC) 10 MG tablet Take 10 mg by mouth once daily.      azelastine (ASTELIN) 137 mcg (0.1 %) nasal spray 1 spray (137 mcg total) by Nasal route 2 (two) times daily. 30 mL 11    budesonide-formoterol 160-4.5 mcg (SYMBICORT) 160-4.5 mcg/actuation HFAA Inhale 2 puffs into the lungs every 12 (twelve) hours.      CAPLYTA 21 mg Cap Take 1 capsule by mouth.      DUPIXENT  mg/2 mL PnIj       esomeprazole (NEXIUM) 40 MG capsule Take 40 mg by mouth.      famotidine (PEPCID) 40 MG tablet Take 1 tablet (40 mg total) by mouth every evening. 90 tablet 3    FLUoxetine 40 MG capsule Take 40 mg by mouth 2 (two) times daily.      olmesartan (BENICAR) 20 MG tablet Take 20 mg by mouth.      OXcarbazepine (TRILEPTAL) 300 MG Tab Take 300 mg by mouth 2 (two) times daily.      prochlorperazine (COMPAZINE) 10 MG tablet TAKE 1 TABLET(10 MG) BY MOUTH EVERY 6 HOURS AS NEEDED FOR MIGRAINE OR NAUSEA 60 tablet 3    rimegepant (NURTEC) 75 mg odt Take 1 tablet (75 mg total) by mouth daily as needed for Migraine. Place ODT tablet on the  tongue; alternatively the ODT tablet may be placed under the tongue 16 tablet 11    rizatriptan (MAXALT) 10 MG tablet 1 tab PO PRN migraine. May repeat every 2 hours for max 3 tabs in 24 hours. Use no more than 10 days per month. 18 tablet 11    rosuvastatin (CRESTOR) 10 MG tablet Take 10 mg by mouth.      tiZANidine (ZANAFLEX) 4 MG tablet Half or full tablet by mouth at night as needed for muscle spasm 30 tablet 11    traZODone (DESYREL) 100 MG tablet Take 100 mg by mouth.      atogepant (QULIPTA) 60 mg Tab Take 1 tablet (60 mg total) by mouth once daily. 30 tablet 11    cloNIDine 0.1 mg/24 hr td ptwk (CATAPRES) 0.1 mg/24 hr Place 1 patch onto the skin every 7 days.      diclofenac (VOLTAREN) 50 MG EC tablet Take 50 mg by mouth 4 (four) times daily as needed.       No current facility-administered medications for this visit.       Past Medical History  Past Medical History:   Diagnosis Date    Anxiety     Bipolar disorder     Depression     Hypertension     Medication overuse headache 2024    Resolved. She was previously taking daily ibuprofen for at least past year. Discussed nature of diagnosis and need to stop all OTC to see any improvement in migraine frequency. Add prednisone course and compazine. Lifestyle habits discussed as she does drink high amounts of caffeine daily       Migraine     Panic attacks        Past Surgical History  Past Surgical History:   Procedure Laterality Date     SECTION      ESOPHAGOGASTRODUODENOSCOPY  2024    [facility]    TUBAL LIGATION         Family History  Family History   Problem Relation Name Age of Onset    Hypertension Mother Genice     Arthritis Mother Genice     Asthma Mother Genice     Depression Mother Genice     Hyperlipidemia Mother Genice     Kidney disease Mother Genice     Stroke Mother Genice     Heart disease Father Thomas     Alcohol abuse Father Thomas     Arthritis Father Thomas     Asthma Father Thomas     COPD Father Thomas     Hyperlipidemia  Father Thomas     Depression Other      Asthma Brother Zaki        Social History  Social History     Socioeconomic History    Marital status:    Tobacco Use    Smoking status: Every Day     Current packs/day: 1.00     Average packs/day: 1 pack/day for 25.0 years (25.0 ttl pk-yrs)     Types: Cigarettes    Smokeless tobacco: Never   Substance and Sexual Activity    Alcohol use: Not Currently     Comment: occ    Drug use: Yes     Frequency: 7.0 times per week     Types: Marijuana    Sexual activity: Yes     Partners: Male     Birth control/protection: See Surgical Hx   Social History Narrative    ** Merged History Encounter **          Social Drivers of Health     Financial Resource Strain: Medium Risk (2/22/2024)    Overall Financial Resource Strain (CARDIA)     Difficulty of Paying Living Expenses: Somewhat hard   Food Insecurity: No Food Insecurity (2/22/2024)    Hunger Vital Sign     Worried About Running Out of Food in the Last Year: Never true     Ran Out of Food in the Last Year: Never true   Transportation Needs: No Transportation Needs (2/22/2024)    PRAPARE - Transportation     Lack of Transportation (Medical): No     Lack of Transportation (Non-Medical): No   Physical Activity: Sufficiently Active (2/22/2024)    Exercise Vital Sign     Days of Exercise per Week: 4 days     Minutes of Exercise per Session: 60 min   Stress: Stress Concern Present (2/22/2024)    [employer] [place] [employer] - Occupational Stress Questionnaire     Feeling of Stress : Very much   Housing Stability: High Risk (2/22/2024)    Housing Stability Vital Sign     Unable to Pay for Housing in the Last Year: Yes     Number of Places Lived in the Last Year: 1     Unstable Housing in the Last Year: No        Objective:        Vitals:    01/31/25 0936   BP: 122/80   Pulse: 78   Resp: 17   Temp: 97.8 °F (36.6 °C)           Body mass index is 35.08 kg/m².    1/31/25  Constitutional:   She appears well-developed and  well-nourished. She is well groomed     Neurological Exam:  General: well-developed, well-nourished, no distress  Mental status: Awake and alert  Speech language: No dysarthria or aphasia on conversation  Cranial nerves: Face symmetric  Motor: Moves all extremities well  Coordination: No ataxia. No tremor.    2/22/24  Constitutional: appears in no acute distress, well-developed, well-nourished     Eyes: normal conjunctiva, PERRLA    Ears, nose, mouth, throat: external appearance of ears and nose normal, hearing intact     Cardiovascular: regular rate and rhythm, no murmurs appreciated    Respiratory: unlabored respirations, breath sounds normal bilaterally    Gastrointestinal: no visible abdominal masses, no guarding, no visible hernia    Musculoskeletal: normal tone in all four extremities. No abnormal movements. No pronator drift. No orbit. Symmetric finger tapping. Normal station. Normal regular gait. Normal tandem gait.      Spine:   CERVICAL SPINE:  ROM: normal   MUSCLE SPASM: in all planes    FACET LOADING: bilateral    SPURLING: no  LEONID / VINCE tender: no     Psychiatric: normal judgment and insight. Oriented to person, place, and time.     Neurologic:   Cortical functions: recent and remote memory intact, normal attention span and concentration, speech fluent, adequate fund of knowledge   Cranial nerves: visual fields full, PERRLA, EOMI, symmetric facial strength, hearing intact, palate elevates symmetrically, shoulder shrug 5/5, tongue protrudes midline   Reflexes: 2+ in the upper and lower extremities, no Chapa  Sensation: intact to temperature throughout   Coordination: normal finger to nose, heel to shin    Data Review:     I have personally reviewed the referring provider's notes, labs, & imaging made available to me today.      RADIOLOGY STUDIES:  I have personally reviewed the pertinent images performed.       No results found for this or any previous visit.    Lab Results   Component Value Date    NA  137 02/16/2024    K 4.1 02/16/2024     02/16/2024    CO2 23 02/16/2024    BUN 7 02/16/2024    CREATININE 0.7 02/16/2024    GLU 93 02/16/2024    HGBA1C 5.2 02/16/2024    AST 15 02/16/2024    ALT 16 02/16/2024    ALBUMIN 3.9 02/16/2024    PROT 6.4 02/16/2024    BILITOT 0.3 02/16/2024    CHOL 232 (H) 02/16/2024    HDL 45 02/16/2024    LDLCALC 150.4 02/16/2024    TRIG 183 (H) 02/16/2024       Lab Results   Component Value Date    WBC 9.70 02/16/2024    HGB 14.9 02/16/2024    HCT 45.7 02/16/2024    MCV 94 02/16/2024     02/16/2024       Lab Results   Component Value Date    TSH 1.008 02/17/2012           Assessment & Plan:       Problem List Items Addressed This Visit          Neuro    Intractable chronic migraine without aura and without status migrainosus - Primary    Overview     Migraine headaches since her 20's. Headaches are typically unilateral, moderate to severe in intensity, worsen with activity, pounding in quality and associated with sensitivity to light and sound.   We discussed options for prevention to include CGRP vs Botox. Will start with CGRP. Emgality not covering the full month. She will be starting Botox with pain management within the month. Add Qulipta.  For acute attacks, continue Ubrelvy.         Relevant Medications    atogepant (QULIPTA) 60 mg Tab       Endocrine    Severe obesity (BMI 35.0-39.9) with comorbidity    Current Assessment & Plan     BMI 35 follow up with PCP          Other Visit Diagnoses       Dystonia        Pending Botox. Can consider referral to Dr. Hooper if no improvement with outside dystonia provider.                      Please call our clinic at [phone] or send a message on the [employer] portal if there are any changes to the plan described below, for example,if you are not contacted for the requested tests, referral(s) within one week, if you are unable to receive the medications prescribed, or if you feel you need to change the treatment course for any  reason.     TESTING:  -- none     REFERRALS:  -- none     PREVENTION (use daily regardless of headache):  -- stop Emgality  -- START Qulipta  -- continue magnesium in ONE of the following preparations -               1. Magnesium oxide 800mg daily (the most common over the counter kind, may causes loose stools)              2. Magnesium citrate 400-500mg daily (harder to find, but more neutral on the bowels)              3. Magnesium glycinate 400mg daily (hardest to find, look online, but most bowel-neutral, best absorbed)     AS-NEEDED TREATMENT (use total no more than 10 days per month unless otherwise stated):  -- continue rizatriptan with next migraine. You can repeat two hours later if needed  -- continue Nurtec with next migraine. This dissolves under the tongue and is only taken once in 24 hour time frame.  -- continue tizanidine at night. This is a muscle relaxer and it will also make you potentially sleepy. Start with half a tablet to see how you respond but can take up to a whole tablet if needed  -- continue compazine. This is a nausea medication that also has anti-migraine properties. Can take daily and will not contribute to rebound headaches    Follow up in about 3 months (around 4/30/2025).    Richelle Ordonez NP

## 2025-01-31 NOTE — PATIENT INSTRUCTIONS
Please call our clinic at [phone] or send a message on the [employer] portal if there are any changes to the plan described below, for example,if you are not contacted for the requested tests, referral(s) within one week, if you are unable to receive the medications prescribed, or if you feel you need to change the treatment course for any reason.     TESTING:  -- none     REFERRALS:  -- none     PREVENTION (use daily regardless of headache):  -- stop Emgality  -- START Qulipta  -- continue magnesium in ONE of the following preparations -               1. Magnesium oxide 800mg daily (the most common over the counter kind, may causes loose stools)              2. Magnesium citrate 400-500mg daily (harder to find, but more neutral on the bowels)              3. Magnesium glycinate 400mg daily (hardest to find, look online, but most bowel-neutral, best absorbed)     AS-NEEDED TREATMENT (use total no more than 10 days per month unless otherwise stated):  -- continue rizatriptan with next migraine. You can repeat two hours later if needed  -- continue Nurtec with next migraine. This dissolves under the tongue and is only taken once in 24 hour time frame.  -- continue tizanidine at night. This is a muscle relaxer and it will also make you potentially sleepy. Start with half a tablet to see how you respond but can take up to a whole tablet if needed  -- continue compazine. This is a nausea medication that also has anti-migraine properties. Can take daily and will not contribute to rebound headaches

## 2025-02-05 ENCOUNTER — OFFICE VISIT (OUTPATIENT)
Dept: CARDIOTHORACIC SURGERY | Facility: CLINIC | Age: 40
End: 2025-02-05
Payer: COMMERCIAL

## 2025-02-05 VITALS
DIASTOLIC BLOOD PRESSURE: 76 MMHG | SYSTOLIC BLOOD PRESSURE: 110 MMHG | HEART RATE: 94 BPM | WEIGHT: 192.69 LBS | OXYGEN SATURATION: 95 % | BODY MASS INDEX: 35.46 KG/M2 | HEIGHT: 62 IN

## 2025-02-05 DIAGNOSIS — S22.42XK CLOSED FRACTURE OF MULTIPLE RIBS OF LEFT SIDE WITH NONUNION, SUBSEQUENT ENCOUNTER: ICD-10-CM

## 2025-02-05 DIAGNOSIS — G43.719 INTRACTABLE CHRONIC MIGRAINE WITHOUT AURA AND WITHOUT STATUS MIGRAINOSUS: Primary | ICD-10-CM

## 2025-02-05 DIAGNOSIS — I10 PRIMARY HYPERTENSION: ICD-10-CM

## 2025-02-05 DIAGNOSIS — R07.89 CHEST WALL PAIN: ICD-10-CM

## 2025-02-05 DIAGNOSIS — F41.9 ANXIETY: ICD-10-CM

## 2025-02-05 PROCEDURE — 4010F ACE/ARB THERAPY RXD/TAKEN: CPT | Mod: CPTII,S$GLB,, | Performed by: THORACIC SURGERY (CARDIOTHORACIC VASCULAR SURGERY)

## 2025-02-05 PROCEDURE — 3008F BODY MASS INDEX DOCD: CPT | Mod: CPTII,S$GLB,, | Performed by: THORACIC SURGERY (CARDIOTHORACIC VASCULAR SURGERY)

## 2025-02-05 PROCEDURE — 1159F MED LIST DOCD IN RCRD: CPT | Mod: CPTII,S$GLB,, | Performed by: THORACIC SURGERY (CARDIOTHORACIC VASCULAR SURGERY)

## 2025-02-05 PROCEDURE — 99999 PR PBB SHADOW E&M-EST. PATIENT-LVL IV: CPT | Mod: PBBFAC,,, | Performed by: THORACIC SURGERY (CARDIOTHORACIC VASCULAR SURGERY)

## 2025-02-05 PROCEDURE — 3078F DIAST BP <80 MM HG: CPT | Mod: CPTII,S$GLB,, | Performed by: THORACIC SURGERY (CARDIOTHORACIC VASCULAR SURGERY)

## 2025-02-05 PROCEDURE — 99203 OFFICE O/P NEW LOW 30 MIN: CPT | Mod: S$GLB,,, | Performed by: THORACIC SURGERY (CARDIOTHORACIC VASCULAR SURGERY)

## 2025-02-05 PROCEDURE — 3074F SYST BP LT 130 MM HG: CPT | Mod: CPTII,S$GLB,, | Performed by: THORACIC SURGERY (CARDIOTHORACIC VASCULAR SURGERY)
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
## 2025-03-01 DIAGNOSIS — G43.719 INTRACTABLE CHRONIC MIGRAINE WITHOUT AURA AND WITHOUT STATUS MIGRAINOSUS: ICD-10-CM

## 2025-03-06 RX ORDER — RIZATRIPTAN BENZOATE 10 MG/1
TABLET ORAL
Qty: 18 TABLET | Refills: 11 | Status: SHIPPED | OUTPATIENT
Start: 2025-03-06

## 2025-03-31 ENCOUNTER — OFFICE VISIT (OUTPATIENT)
Dept: FAMILY MEDICINE | Facility: CLINIC | Age: 40
End: 2025-03-31
Payer: COMMERCIAL

## 2025-03-31 DIAGNOSIS — K21.9 GASTROESOPHAGEAL REFLUX DISEASE, UNSPECIFIED WHETHER ESOPHAGITIS PRESENT: Primary | ICD-10-CM

## 2025-03-31 DIAGNOSIS — R11.2 NAUSEA AND VOMITING, UNSPECIFIED VOMITING TYPE: ICD-10-CM

## 2025-03-31 PROCEDURE — 1159F MED LIST DOCD IN RCRD: CPT | Mod: CPTII,95,, | Performed by: INTERNAL MEDICINE

## 2025-03-31 PROCEDURE — 4010F ACE/ARB THERAPY RXD/TAKEN: CPT | Mod: CPTII,95,, | Performed by: INTERNAL MEDICINE

## 2025-03-31 PROCEDURE — 98005 SYNCH AUDIO-VIDEO EST LOW 20: CPT | Mod: 95,,, | Performed by: INTERNAL MEDICINE

## 2025-03-31 RX ORDER — LIDOCAINE 50 MG/G
1 PATCH TOPICAL DAILY
COMMUNITY
Start: 2025-02-09

## 2025-03-31 RX ORDER — HYDROCODONE BITARTRATE AND ACETAMINOPHEN 7.5; 325 MG/1; MG/1
1 TABLET ORAL EVERY 4 HOURS PRN
COMMUNITY

## 2025-03-31 RX ORDER — BUPROPION HYDROCHLORIDE 150 MG/1
150 TABLET ORAL EVERY MORNING
COMMUNITY

## 2025-03-31 RX ORDER — PROMETHAZINE HYDROCHLORIDE 12.5 MG/1
12.5 TABLET ORAL 4 TIMES DAILY
Qty: 20 TABLET | Refills: 0 | Status: SHIPPED | OUTPATIENT
Start: 2025-03-31

## 2025-03-31 RX ORDER — PRAZOSIN HYDROCHLORIDE 1 MG/1
1 CAPSULE ORAL NIGHTLY
COMMUNITY

## 2025-03-31 RX ORDER — TRIAMCINOLONE ACETONIDE 55 UG/1
2 SPRAY, METERED NASAL
COMMUNITY
Start: 2024-12-19
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
## 2025-05-01 ENCOUNTER — TELEPHONE (OUTPATIENT)
Dept: PHARMACY | Facility: CLINIC | Age: 40
End: 2025-05-01
Payer: COMMERCIAL

## 2025-05-01 NOTE — TELEPHONE ENCOUNTER
[facility]Population Health Chart Review & Patient Outreach Details For Medication Adherence Project    Reason for Outreach Encounter: 3rd Party payor non-compliance report ([employer], [employer], [employer], etc)  2.  Patient Outreach Method: Reviewed Patient Chart  3.   Medication in question: rosuvastatin   LAST FILLED: 4/24/25 for 90 day supply  Hyperlipidemia Medications              rosuvastatin (CRESTOR) 10 MG tablet Take 10 mg by mouth.               4.  Reviewed and or Updates Made To: Patient Chart  5. Outreach Outcomes and/or actions taken: Patient filled medication and is on track to be adherent

## 2025-05-19 RX ORDER — PROCHLORPERAZINE MALEATE 10 MG
TABLET ORAL
Qty: 60 TABLET | Refills: 3 | Status: SHIPPED | OUTPATIENT
Start: 2025-05-19

## 2025-06-04 ENCOUNTER — LAB VISIT (OUTPATIENT)
Dept: LAB | Facility: HOSPITAL | Age: 40
End: 2025-06-04
Attending: NURSE PRACTITIONER
Payer: COMMERCIAL

## 2025-06-04 ENCOUNTER — OFFICE VISIT (OUTPATIENT)
Dept: NEUROLOGY | Facility: CLINIC | Age: 40
End: 2025-06-04
Payer: COMMERCIAL

## 2025-06-04 VITALS
HEIGHT: 62 IN | RESPIRATION RATE: 20 BRPM | BODY MASS INDEX: 33.18 KG/M2 | SYSTOLIC BLOOD PRESSURE: 118 MMHG | WEIGHT: 180.31 LBS | HEART RATE: 86 BPM | DIASTOLIC BLOOD PRESSURE: 79 MMHG

## 2025-06-04 DIAGNOSIS — G44.40 MEDICATION OVERUSE HEADACHE: ICD-10-CM

## 2025-06-04 DIAGNOSIS — Z51.81 MEDICATION MONITORING ENCOUNTER: ICD-10-CM

## 2025-06-04 DIAGNOSIS — G43.719 INTRACTABLE CHRONIC MIGRAINE WITHOUT AURA AND WITHOUT STATUS MIGRAINOSUS: Primary | ICD-10-CM

## 2025-06-04 DIAGNOSIS — G24.9 DYSTONIA: ICD-10-CM

## 2025-06-04 LAB
ALBUMIN SERPL BCP-MCNC: 4 G/DL (ref 3.5–5.2)
ALP SERPL-CCNC: 94 UNIT/L (ref 40–150)
ALT SERPL W/O P-5'-P-CCNC: 15 UNIT/L (ref 10–44)
ANION GAP (OHS): 7 MMOL/L (ref 8–16)
AST SERPL-CCNC: 14 UNIT/L (ref 11–45)
BILIRUB SERPL-MCNC: 0.2 MG/DL (ref 0.1–1)
BUN SERPL-MCNC: <3 MG/DL (ref 6–20)
CALCIUM SERPL-MCNC: 8.7 MG/DL (ref 8.7–10.5)
CHLORIDE SERPL-SCNC: 99 MMOL/L (ref 95–110)
CO2 SERPL-SCNC: 25 MMOL/L (ref 23–29)
CREAT SERPL-MCNC: 0.7 MG/DL (ref 0.5–1.4)
GFR SERPLBLD CREATININE-BSD FMLA CKD-EPI: >60 ML/MIN/1.73/M2
GLUCOSE SERPL-MCNC: 101 MG/DL (ref 70–110)
POTASSIUM SERPL-SCNC: 3.7 MMOL/L (ref 3.5–5.1)
PROT SERPL-MCNC: 6.6 GM/DL (ref 6–8.4)
SODIUM SERPL-SCNC: 131 MMOL/L (ref 136–145)

## 2025-06-04 PROCEDURE — 1160F RVW MEDS BY RX/DR IN RCRD: CPT | Mod: CPTII,S$GLB,, | Performed by: NURSE PRACTITIONER

## 2025-06-04 PROCEDURE — 99999 PR PBB SHADOW E&M-EST. PATIENT-LVL V: CPT | Mod: PBBFAC,,, | Performed by: NURSE PRACTITIONER

## 2025-06-04 PROCEDURE — 3078F DIAST BP <80 MM HG: CPT | Mod: CPTII,S$GLB,, | Performed by: NURSE PRACTITIONER

## 2025-06-04 PROCEDURE — 3008F BODY MASS INDEX DOCD: CPT | Mod: CPTII,S$GLB,, | Performed by: NURSE PRACTITIONER

## 2025-06-04 PROCEDURE — 1159F MED LIST DOCD IN RCRD: CPT | Mod: CPTII,S$GLB,, | Performed by: NURSE PRACTITIONER

## 2025-06-04 PROCEDURE — 36415 COLL VENOUS BLD VENIPUNCTURE: CPT | Mod: PO

## 2025-06-04 PROCEDURE — 80053 COMPREHEN METABOLIC PANEL: CPT

## 2025-06-04 PROCEDURE — 3074F SYST BP LT 130 MM HG: CPT | Mod: CPTII,S$GLB,, | Performed by: NURSE PRACTITIONER

## 2025-06-04 PROCEDURE — 99214 OFFICE O/P EST MOD 30 MIN: CPT | Mod: S$GLB,,, | Performed by: NURSE PRACTITIONER

## 2025-06-04 PROCEDURE — 4010F ACE/ARB THERAPY RXD/TAKEN: CPT | Mod: CPTII,S$GLB,, | Performed by: NURSE PRACTITIONER

## 2025-06-04 RX ORDER — UBROGEPANT 100 MG/1
TABLET ORAL
Qty: 16 TABLET | Refills: 11 | Status: SHIPPED | OUTPATIENT
Start: 2025-06-04

## 2025-06-05 ENCOUNTER — RESULTS FOLLOW-UP (OUTPATIENT)
Dept: NEUROLOGY | Facility: CLINIC | Age: 40
End: 2025-06-05

## 2025-06-06 ENCOUNTER — PATIENT MESSAGE (OUTPATIENT)
Dept: FAMILY MEDICINE | Facility: CLINIC | Age: 40
End: 2025-06-06
Payer: COMMERCIAL

## 2025-06-11 ENCOUNTER — PATIENT OUTREACH (OUTPATIENT)
Dept: ADMINISTRATIVE | Facility: HOSPITAL | Age: 40
End: 2025-06-11
Payer: COMMERCIAL

## 2025-06-18 ENCOUNTER — OFFICE VISIT (OUTPATIENT)
Dept: PRIMARY CARE CLINIC | Facility: CLINIC | Age: 40
End: 2025-06-18
Payer: COMMERCIAL

## 2025-06-18 VITALS
SYSTOLIC BLOOD PRESSURE: 124 MMHG | WEIGHT: 181.13 LBS | DIASTOLIC BLOOD PRESSURE: 70 MMHG | TEMPERATURE: 99 F | HEART RATE: 95 BPM | BODY MASS INDEX: 33.33 KG/M2 | OXYGEN SATURATION: 98 % | HEIGHT: 62 IN

## 2025-06-18 DIAGNOSIS — J06.9 UPPER RESPIRATORY TRACT INFECTION, UNSPECIFIED TYPE: ICD-10-CM

## 2025-06-18 DIAGNOSIS — E87.1 HYPONATREMIA: Primary | ICD-10-CM

## 2025-06-18 DIAGNOSIS — F31.81 BIPOLAR 2 DISORDER: ICD-10-CM

## 2025-06-18 DIAGNOSIS — I10 PRIMARY HYPERTENSION: ICD-10-CM

## 2025-06-18 PROCEDURE — 3078F DIAST BP <80 MM HG: CPT | Mod: CPTII,S$GLB,, | Performed by: INTERNAL MEDICINE

## 2025-06-18 PROCEDURE — 3074F SYST BP LT 130 MM HG: CPT | Mod: CPTII,S$GLB,, | Performed by: INTERNAL MEDICINE

## 2025-06-18 PROCEDURE — G2211 COMPLEX E/M VISIT ADD ON: HCPCS | Mod: S$GLB,,, | Performed by: INTERNAL MEDICINE

## 2025-06-18 PROCEDURE — 99999 PR PBB SHADOW E&M-EST. PATIENT-LVL V: CPT | Mod: PBBFAC,,, | Performed by: INTERNAL MEDICINE

## 2025-06-18 PROCEDURE — 1160F RVW MEDS BY RX/DR IN RCRD: CPT | Mod: CPTII,S$GLB,, | Performed by: INTERNAL MEDICINE

## 2025-06-18 PROCEDURE — 4010F ACE/ARB THERAPY RXD/TAKEN: CPT | Mod: CPTII,S$GLB,, | Performed by: INTERNAL MEDICINE

## 2025-06-18 PROCEDURE — 3008F BODY MASS INDEX DOCD: CPT | Mod: CPTII,S$GLB,, | Performed by: INTERNAL MEDICINE

## 2025-06-18 PROCEDURE — 1159F MED LIST DOCD IN RCRD: CPT | Mod: CPTII,S$GLB,, | Performed by: INTERNAL MEDICINE

## 2025-06-18 PROCEDURE — 99214 OFFICE O/P EST MOD 30 MIN: CPT | Mod: S$GLB,,, | Performed by: INTERNAL MEDICINE

## 2025-06-18 RX ORDER — TRAZODONE HYDROCHLORIDE 150 MG/1
150 TABLET ORAL NIGHTLY
COMMUNITY
Start: 2025-06-11

## 2025-06-18 RX ORDER — AMLODIPINE BESYLATE 10 MG/1
10 TABLET ORAL DAILY
Qty: 90 TABLET | Refills: 3 | Status: SHIPPED | OUTPATIENT
Start: 2025-06-18

## 2025-06-18 RX ORDER — PREDNISONE 20 MG/1
40 TABLET ORAL DAILY
Qty: 10 TABLET | Refills: 0 | Status: SHIPPED | OUTPATIENT
Start: 2025-06-18 | End: 2025-06-23

## 2025-06-18 RX ORDER — RABEPRAZOLE SODIUM 20 MG/1
20 TABLET, DELAYED RELEASE ORAL DAILY
COMMUNITY
Start: 2025-05-19

## 2025-06-18 RX ORDER — OLMESARTAN MEDOXOMIL 20 MG/1
20 TABLET ORAL DAILY
Qty: 90 TABLET | Refills: 3 | Status: SHIPPED | OUTPATIENT
Start: 2025-06-18

## 2025-06-18 RX ORDER — LEVOCETIRIZINE DIHYDROCHLORIDE 5 MG/1
5 TABLET, FILM COATED ORAL NIGHTLY
Qty: 30 TABLET | Refills: 0 | Status: SHIPPED | OUTPATIENT
Start: 2025-06-18 | End: 2026-06-18

## 2025-06-20 ENCOUNTER — LAB VISIT (OUTPATIENT)
Dept: LAB | Facility: HOSPITAL | Age: 40
End: 2025-06-20
Attending: INTERNAL MEDICINE
Payer: COMMERCIAL

## 2025-06-20 DIAGNOSIS — E87.1 HYPONATREMIA: ICD-10-CM

## 2025-06-20 LAB
ALBUMIN SERPL BCP-MCNC: 4.1 G/DL (ref 3.5–5.2)
ALP SERPL-CCNC: 100 UNIT/L (ref 40–150)
ALT SERPL W/O P-5'-P-CCNC: 17 UNIT/L (ref 10–44)
ANION GAP (OHS): 9 MMOL/L (ref 8–16)
AST SERPL-CCNC: 15 UNIT/L (ref 11–45)
BILIRUB SERPL-MCNC: 0.3 MG/DL (ref 0.1–1)
BUN SERPL-MCNC: 4 MG/DL (ref 6–20)
CALCIUM SERPL-MCNC: 9.1 MG/DL (ref 8.7–10.5)
CHLORIDE SERPL-SCNC: 97 MMOL/L (ref 95–110)
CO2 SERPL-SCNC: 27 MMOL/L (ref 23–29)
CREAT SERPL-MCNC: 0.6 MG/DL (ref 0.5–1.4)
GFR SERPLBLD CREATININE-BSD FMLA CKD-EPI: >60 ML/MIN/1.73/M2
GLUCOSE SERPL-MCNC: 90 MG/DL (ref 70–110)
POTASSIUM SERPL-SCNC: 4.1 MMOL/L (ref 3.5–5.1)
PROT SERPL-MCNC: 6.7 GM/DL (ref 6–8.4)
SODIUM SERPL-SCNC: 133 MMOL/L (ref 136–145)

## 2025-06-20 PROCEDURE — 80183 DRUG SCRN QUANT OXCARBAZEPIN: CPT

## 2025-06-20 PROCEDURE — 36415 COLL VENOUS BLD VENIPUNCTURE: CPT | Mod: PO

## 2025-06-20 PROCEDURE — 80053 COMPREHEN METABOLIC PANEL: CPT

## 2025-06-24 LAB — 10OH-CARBAZEPINE SERPL-MCNC: 12 MCG/ML (ref 10–35)

## 2025-06-25 ENCOUNTER — RESULTS FOLLOW-UP (OUTPATIENT)
Dept: FAMILY MEDICINE | Facility: CLINIC | Age: 40
End: 2025-06-25

## 2025-06-27 NOTE — PROGRESS NOTES
Assessment & Plan  1. Ear pain.  - Persistent postnasal drip and ear pressure  - Examination reveals fluid in the right ear without signs of bacterial infection.  - Discussed the use of nasal sprays and potential addition of Zyrtec or Xyzal for symptom relief.  - Prescribed a 5-day course of prednisone; advised to report any worsening symptoms by Monday for possible antibiotic treatment.    2. Low sodium level/bipolar treatment  - Previous lab results indicated low sodium levels, potentially related to Trileptal use, prescribed for treatment of known bipolar disorder.  - Ordered repeat CMP to monitor sodium levels, to be completed within the next month.  - Included Trileptal level check in upcoming lab work.    3. Blood pressure management.  - Blood pressure readings are within the normal range.  - Refills for amlodipine and olmesartan will be provided.  - Medication sent to pharmacy.    Problem List Items Addressed This Visit          Psychiatric    Bipolar 2 disorder       Cardiac/Vascular    Primary hypertension    Relevant Medications    amLODIPine (NORVASC) 10 MG tablet    olmesartan (BENICAR) 20 MG tablet     Other Visit Diagnoses         Hyponatremia    -  Primary    Relevant Orders    Comprehensive Metabolic Panel (Completed)    Oxcarbazepine Level (Completed)      Upper respiratory tract infection, unspecified type        Relevant Medications    levocetirizine (XYZAL) 5 MG tablet              Visit today included increased complexity associated with the care of the episodic problem(s) addressed and managing the longitudinal care of the patient due to the serious and/or complex managed problem(s). See above assessment/plan.    Follow up if symptoms worsen or fail to improve.    Jeni Bull MD  _____________________________________________________________________________________________________________________________________________________    CC: ear discomfort    Patient is in clinic today as an  established patient.    History of Present Illness  The patient presents for evaluation of ear pain, low sodium level, and blood pressure management.    She reports a persistent postnasal drip. This condition often leads to severe congestion during illness, resulting in ear pain. The right ear appears to be more affected than the left, with no noticeable drainage but a sensation of pressure. These symptoms began approximately 3 to 4 days ago. She has been self-medicating with NyQuil, which provides some relief, and also uses Astelin nasal spray. She experienced a low-grade fever of 99.5 degrees on 06/17/2025, which she suspects may have been a hot flash. Additionally, she reports a mild sore throat. She is not currently taking any antihistamines such as Zyrtec or Xyzal. She has previously undergone allergy testing.    We discussed recent lab results, including hyponatremia. She is aware that Trileptal can potentially lower sodium levels and is uncertain if this information was communicated to her. She is not currently on any diuretic medication.    She continues her regimen of olmesartan and amlodipine but has not consulted her cardiologist recently. She requests refills of these medications.     No other new complaints today.  Remaining chronic conditions have been reviewed and remain stable. Further detail as stated above.      Medications Ordered Prior to Encounter[1]    Review of Systems   Constitutional:  Negative for chills, diaphoresis, fatigue and fever.   HENT:  Positive for postnasal drip. Negative for congestion, ear pain, sinus pain and sore throat.         Bilateral ear pressure   Eyes:  Negative for pain and redness.   Respiratory:  Negative for cough, chest tightness and shortness of breath.    Cardiovascular:  Negative for chest pain and leg swelling.   Gastrointestinal:  Negative for abdominal pain, constipation, diarrhea, nausea and vomiting.   Genitourinary:  Negative for dysuria and hematuria.  "  Musculoskeletal:  Negative for arthralgias and joint swelling.   Skin:  Negative for rash.   Neurological:  Negative for dizziness, syncope and headaches.   Psychiatric/Behavioral:  Negative for dysphoric mood. The patient is not nervous/anxious.        Vitals:    06/18/25 1357   BP: 124/70   BP Location: Left arm   Patient Position: Sitting   Pulse: 95   Temp: 98.5 °F (36.9 °C)   SpO2: 98%   Weight: 82.2 kg (181 lb 1.7 oz)   Height: 5' 2" (1.575 m)       Wt Readings from Last 3 Encounters:   06/18/25 82.2 kg (181 lb 1.7 oz)   06/04/25 81.8 kg (180 lb 5.4 oz)   02/05/25 87.4 kg (192 lb 10.9 oz)       Physical Exam  Constitutional:       General: She is not in acute distress.     Appearance: Normal appearance. She is well-developed.   HENT:      Head: Normocephalic and atraumatic.      Right Ear: Ear canal and external ear normal. A middle ear effusion is present. Tympanic membrane is not erythematous, retracted or bulging.      Left Ear: Ear canal and external ear normal. A middle ear effusion is present. Tympanic membrane is not erythematous, retracted or bulging.   Eyes:      Conjunctiva/sclera: Conjunctivae normal.   Cardiovascular:      Rate and Rhythm: Normal rate and regular rhythm.      Pulses: Normal pulses.      Heart sounds: Normal heart sounds. No murmur heard.  Pulmonary:      Effort: Pulmonary effort is normal. No respiratory distress.      Breath sounds: Normal breath sounds.   Abdominal:      General: Bowel sounds are normal. There is no distension.      Palpations: Abdomen is soft.      Tenderness: There is no abdominal tenderness.   Musculoskeletal:         General: Normal range of motion.      Cervical back: Normal range of motion and neck supple.   Skin:     General: Skin is warm and dry.      Findings: No rash.   Neurological:      General: No focal deficit present.      Mental Status: She is alert and oriented to person, place, and time.   Psychiatric:         Mood and Affect: Mood normal.    "      Behavior: Behavior normal.         DISCLAIMER: This note was compiled by using a speech recognition dictation system and therefore please be aware that typographical / speech recognition errors can and do occur.  Please contact me if you see any errors specifically.  Consent was obtained for [facility] recording system prior to the visit.         [1]   Current Outpatient Medications on File Prior to Visit   Medication Sig Dispense Refill    albuterol (PROVENTIL/VENTOLIN HFA) 90 mcg/actuation inhaler Inhale 2 puffs into the lungs every 4 (four) hours as needed.      atogepant (QULIPTA) 60 mg Tab Take 1 tablet (60 mg total) by mouth once daily. 30 tablet 11    azelastine (ASTELIN) 137 mcg (0.1 %) nasal spray 1 spray (137 mcg total) by Nasal route 2 (two) times daily. 30 mL 11    buPROPion (WELLBUTRIN XL) 150 MG TB24 tablet Take 150 mg by mouth every morning.      DUPIXENT  mg/2 mL PnIj       FLUoxetine 40 MG capsule Take 40 mg by mouth 2 (two) times daily.      HYDROcodone-acetaminophen (NORCO) 7.5-325 mg per tablet Take 1 tablet by mouth every 4 (four) hours as needed for Pain.      LIDOcaine (LIDODERM) 5 % Place 1 patch onto the skin once daily.      OXcarbazepine (TRILEPTAL) 300 MG Tab Take 300 mg by mouth 2 (two) times daily.      prazosin (MINIPRESS) 1 MG Cap Take 1 mg by mouth every evening.      prochlorperazine (COMPAZINE) 10 MG tablet TAKE 1 TABLET(10 MG) BY MOUTH EVERY 6 HOURS AS NEEDED FOR MIGRAINE OR NAUSEA 60 tablet 3    promethazine (PHENERGAN) 12.5 MG Tab Take 1 tablet (12.5 mg total) by mouth 4 (four) times daily. 20 tablet 0    RABEprazole (ACIPHEX) 20 mg tablet Take 20 mg by mouth once daily.      rizatriptan (MAXALT) 10 MG tablet TAKE 1 TABLET BY MOUTH AS NEEDED FOR MIGRAINES. MAY REPEAT EVERY 2 HOURS FOR MAX 3 TABLETS IN 24 HOURS. USE NO MORE THAN 10 DAYS PER MONTH 18 tablet 11    rosuvastatin (CRESTOR) 10 MG tablet Take 10 mg by mouth.      tiZANidine (ZANAFLEX) 4 MG tablet Half or full  tablet by mouth at night as needed for muscle spasm 30 tablet 11    traZODone (DESYREL) 150 MG tablet Take 150 mg by mouth every evening.      triamcinolone (NASACORT) 55 mcg nasal inhaler 2 sprays by Nasal route.      ubrogepant (UBRELVY) 100 mg tablet Take 1 tablet by mouth as needed for migraine. May repeat in 2 hours if needed. Max 2 tablets per day. 16 tablet 11     No current facility-administered medications on file prior to visit.

## 2025-07-03 ENCOUNTER — HOSPITAL ENCOUNTER (OUTPATIENT)
Dept: RADIOLOGY | Facility: HOSPITAL | Age: 40
Discharge: HOME | End: 2025-07-03
Attending: NEUROLOGICAL SURGERY
Payer: COMMERCIAL

## 2025-07-03 DIAGNOSIS — M54.16 LUMBAR RADICULOPATHY: ICD-10-CM

## 2025-07-03 DIAGNOSIS — M54.50 LOW BACK PAIN: ICD-10-CM

## 2025-07-03 PROCEDURE — 72100 X-RAY EXAM L-S SPINE 2/3 VWS: CPT | Mod: 26,,, | Performed by: RADIOLOGY

## 2025-07-03 PROCEDURE — 72100 X-RAY EXAM L-S SPINE 2/3 VWS: CPT | Mod: TC,PO

## 2025-08-05 ENCOUNTER — HOSPITAL ENCOUNTER (OUTPATIENT)
Dept: RADIOLOGY | Facility: HOSPITAL | Age: 40
Discharge: HOME | End: 2025-08-05
Attending: INTERNAL MEDICINE
Payer: COMMERCIAL

## 2025-08-05 DIAGNOSIS — Z12.31 OTHER SCREENING MAMMOGRAM: ICD-10-CM

## 2025-08-05 PROCEDURE — 77063 BREAST TOMOSYNTHESIS BI: CPT | Mod: 26,,, | Performed by: RADIOLOGY

## 2025-08-05 PROCEDURE — 77067 SCR MAMMO BI INCL CAD: CPT | Mod: 26,,, | Performed by: RADIOLOGY

## 2025-08-05 PROCEDURE — 77067 SCR MAMMO BI INCL CAD: CPT | Mod: TC,PO

## 2025-08-18 ENCOUNTER — LAB VISIT (OUTPATIENT)
Dept: LAB | Facility: HOSPITAL | Age: 40
End: 2025-08-18
Attending: INTERNAL MEDICINE
Payer: COMMERCIAL

## 2025-08-18 DIAGNOSIS — Z00.00 ENCOUNTER FOR ANNUAL HEALTH EXAMINATION: ICD-10-CM

## 2025-08-18 DIAGNOSIS — Z13.6 ENCOUNTER FOR LIPID SCREENING FOR CARDIOVASCULAR DISEASE: ICD-10-CM

## 2025-08-18 DIAGNOSIS — Z13.1 SCREENING FOR DIABETES MELLITUS: ICD-10-CM

## 2025-08-18 DIAGNOSIS — Z13.220 ENCOUNTER FOR LIPID SCREENING FOR CARDIOVASCULAR DISEASE: ICD-10-CM

## 2025-08-18 LAB
ABSOLUTE EOSINOPHIL (OHS): 0.06 K/UL
ABSOLUTE MONOCYTE (OHS): 0.81 K/UL (ref 0.3–1)
ABSOLUTE NEUTROPHIL COUNT (OHS): 4.86 K/UL (ref 1.8–7.7)
ALBUMIN SERPL BCP-MCNC: 4.2 G/DL (ref 3.5–5.2)
ALP SERPL-CCNC: 108 UNIT/L (ref 40–150)
ALT SERPL W/O P-5'-P-CCNC: 21 UNIT/L (ref 0–55)
ANION GAP (OHS): 10 MMOL/L (ref 8–16)
AST SERPL-CCNC: 22 UNIT/L (ref 0–50)
BASOPHILS # BLD AUTO: 0.05 K/UL
BASOPHILS NFR BLD AUTO: 0.6 %
BILIRUB SERPL-MCNC: 0.1 MG/DL (ref 0.1–1)
BUN SERPL-MCNC: 10 MG/DL (ref 6–20)
CALCIUM SERPL-MCNC: 9.4 MG/DL (ref 8.7–10.5)
CHLORIDE SERPL-SCNC: 98 MMOL/L (ref 95–110)
CHOLEST SERPL-MCNC: 139 MG/DL (ref 120–199)
CHOLEST/HDLC SERPL: 2.7 {RATIO} (ref 2–5)
CO2 SERPL-SCNC: 25 MMOL/L (ref 23–29)
CREAT SERPL-MCNC: 0.7 MG/DL (ref 0.5–1.4)
EAG (OHS): 105 MG/DL (ref 68–131)
ERYTHROCYTE [DISTWIDTH] IN BLOOD BY AUTOMATED COUNT: 12.6 % (ref 11.5–14.5)
GFR SERPLBLD CREATININE-BSD FMLA CKD-EPI: >60 ML/MIN/1.73/M2
GLUCOSE SERPL-MCNC: 88 MG/DL (ref 70–110)
HBA1C MFR BLD: 5.3 % (ref 4–5.6)
HCT VFR BLD AUTO: 44.9 % (ref 37–48.5)
HDLC SERPL-MCNC: 52 MG/DL (ref 40–75)
HDLC SERPL: 37.4 % (ref 20–50)
HGB BLD-MCNC: 14.8 GM/DL (ref 12–16)
IMM GRANULOCYTES # BLD AUTO: 0.04 K/UL (ref 0–0.04)
IMM GRANULOCYTES NFR BLD AUTO: 0.5 % (ref 0–0.5)
LDLC SERPL CALC-MCNC: 75.2 MG/DL (ref 63–159)
LYMPHOCYTES # BLD AUTO: 2.31 K/UL (ref 1–4.8)
MCH RBC QN AUTO: 30.8 PG (ref 27–31)
MCHC RBC AUTO-ENTMCNC: 33 G/DL (ref 32–36)
MCV RBC AUTO: 94 FL (ref 82–98)
NONHDLC SERPL-MCNC: 87 MG/DL
NUCLEATED RBC (/100WBC) (OHS): 0 /100 WBC
PLATELET # BLD AUTO: 311 K/UL (ref 150–450)
PMV BLD AUTO: 8.9 FL (ref 9.2–12.9)
POTASSIUM SERPL-SCNC: 4.5 MMOL/L (ref 3.5–5.1)
PROT SERPL-MCNC: 6.9 GM/DL (ref 6–8.4)
RBC # BLD AUTO: 4.8 M/UL (ref 4–5.4)
RELATIVE EOSINOPHIL (OHS): 0.7 %
RELATIVE LYMPHOCYTE (OHS): 28.4 % (ref 18–48)
RELATIVE MONOCYTE (OHS): 10 % (ref 4–15)
RELATIVE NEUTROPHIL (OHS): 59.8 % (ref 38–73)
SODIUM SERPL-SCNC: 133 MMOL/L (ref 136–145)
TRIGL SERPL-MCNC: 59 MG/DL (ref 30–150)
TSH SERPL-ACNC: 1.09 UIU/ML (ref 0.4–4)
WBC # BLD AUTO: 8.13 K/UL (ref 3.9–12.7)

## 2025-08-18 PROCEDURE — 83036 HEMOGLOBIN GLYCOSYLATED A1C: CPT

## 2025-08-18 PROCEDURE — 80053 COMPREHEN METABOLIC PANEL: CPT

## 2025-08-18 PROCEDURE — 36415 COLL VENOUS BLD VENIPUNCTURE: CPT | Mod: PO

## 2025-08-18 PROCEDURE — 80061 LIPID PANEL: CPT

## 2025-08-18 PROCEDURE — 85025 COMPLETE CBC W/AUTO DIFF WBC: CPT

## 2025-08-18 PROCEDURE — 84443 ASSAY THYROID STIM HORMONE: CPT
